# Patient Record
Sex: FEMALE | Race: WHITE | Employment: OTHER | URBAN - METROPOLITAN AREA
[De-identification: names, ages, dates, MRNs, and addresses within clinical notes are randomized per-mention and may not be internally consistent; named-entity substitution may affect disease eponyms.]

---

## 2020-07-28 ENCOUNTER — VIRTUAL VISIT (OUTPATIENT)
Dept: FAMILY MEDICINE CLINIC | Age: 55
End: 2020-07-28

## 2020-07-28 DIAGNOSIS — Z79.890 HORMONE REPLACEMENT THERAPY (HRT): ICD-10-CM

## 2020-07-28 DIAGNOSIS — J30.9 ALLERGIC RHINITIS, UNSPECIFIED SEASONALITY, UNSPECIFIED TRIGGER: ICD-10-CM

## 2020-07-28 DIAGNOSIS — R23.2 HOT FLASHES: ICD-10-CM

## 2020-07-28 DIAGNOSIS — I10 ESSENTIAL HYPERTENSION: Primary | ICD-10-CM

## 2020-07-28 RX ORDER — LOSARTAN POTASSIUM 50 MG/1
50 TABLET ORAL DAILY
Qty: 90 TAB | Refills: 0
Start: 2020-07-28 | End: 2020-07-28 | Stop reason: CLARIF

## 2020-07-28 RX ORDER — TESTOSTERONE CYPIONATE 200 MG/ML
0.28 INJECTION INTRAMUSCULAR
COMMUNITY
End: 2021-05-10

## 2020-07-28 RX ORDER — ESTRADIOL 0.05 MG/D
FILM, EXTENDED RELEASE TRANSDERMAL
COMMUNITY
End: 2021-05-10

## 2020-07-28 RX ORDER — PROGESTERONE 100 MG/1
CAPSULE ORAL
COMMUNITY
End: 2021-05-10

## 2020-07-28 NOTE — PROGRESS NOTES
Armando Sapp is a 63 y.o. female who was seen by synchronous (real-time) audio-video technology on 7/28/2020. Consent: Armando Sapp, who was seen by synchronous (real-time) audio-video technology, and/or her healthcare decision maker, is aware that this patient-initiated, Telehealth encounter on 7/28/2020 is a billable service, with coverage as determined by her insurance carrier. She is aware that she may receive a bill and has provided verbal consent to proceed: Yes. Subjective:   Armando Sapp is a 63 y.o. female who was seen for No chief complaint on file. Establish care, PCP moved    HTN- reports good range on losartan/hctz, unsure of dose, she will let us know. Says normotensive on this medication. Says patient first [facility] recently checked labs/refilled her medication. Will request records    Hot flashes-on HRT/ routine gyne care with Dr Gerald Crocker, per pt utd    Allergic rhinitis- following Dr. Mae Blackman. Says tested positive for cat/dog dander. She decided against allergy shots. Doing well on otc antihistamine. Per pt utd with [facility]- request records from prev PCP    Prior to Admission medications    Medication Sig Start Date End Date Taking? Authorizing Provider   estradioL (VIVELLE) 0.05 mg/24 hr estradiol 0.05 mg/24 hr semiweekly transdermal patch   APPLY 1 PATCH TO SKIN TWICE A WEEK AS DIRECTED    Provider, Historical   testosterone cypionate (DEPOTESTOTERONE CYPIONATE) 200 mg/mL injection 0.275 mL.     Provider, Historical   progesterone (PROMETRIUM) 100 mg capsule progesterone micronized 100 mg capsule    Provider, Historical     Not on File    Patient Active Problem List    Diagnosis Date Noted    Essential hypertension 07/28/2020    Allergic rhinitis 07/28/2020     Current Outpatient Medications   Medication Sig Dispense Refill    estradioL (VIVELLE) 0.05 mg/24 hr estradiol 0.05 mg/24 hr semiweekly transdermal patch   APPLY 1 PATCH TO SKIN TWICE A WEEK AS DIRECTED      testosterone cypionate (DEPOTESTOTERONE CYPIONATE) 200 mg/mL injection 0.275 mL.  progesterone (PROMETRIUM) 100 mg capsule progesterone micronized 100 mg capsule       Not on File  No past medical history on file. No past surgical history on file. No family history on file. Social History     Tobacco Use    Smoking status: Not on file   Substance Use Topics    Alcohol use: Not on file       ROS    Objective:   Vital Signs: (As obtained by patient/caregiver at home)  There were no vitals taken for this visit.      [INSTRUCTIONS:  \"[x]\" Indicates a positive item  \"[]\" Indicates a negative item  -- DELETE ALL ITEMS NOT EXAMINED]    Constitutional: [x] Appears well-developed and well-nourished [x] No apparent distress      [] Abnormal -     Mental status: [x] Alert and awake  [x] Oriented to person/place/time [x] Able to follow commands    [] Abnormal -     Eyes:   EOM    [x]  Normal    [] Abnormal -   Sclera  [x]  Normal    [] Abnormal -          Discharge [x]  None visible   [] Abnormal -     HENT: [x] Normocephalic, atraumatic  [] Abnormal -   [x] Mouth/Throat: Mucous membranes are moist    External Ears [x] Normal  [] Abnormal -    Neck: [x] No visualized mass [] Abnormal -     Pulmonary/Chest: [x] Respiratory effort normal   [x] No visualized signs of difficulty breathing or respiratory distress        [] Abnormal -      Musculoskeletal:   [x] Normal gait with no signs of ataxia         [x] Normal range of motion of neck        [] Abnormal -     Neurological:        [x] No Facial Asymmetry (Cranial nerve 7 motor function) (limited exam due to video visit)          [x] No gaze palsy        [] Abnormal -          Skin:        [x] No significant exanthematous lesions or discoloration noted on facial skin         [] Abnormal -            Psychiatric:       [x] Normal Affect [] Abnormal -        [x] No Hallucinations    Other pertinent observable physical exam findings:-        We discussed the expected course, resolution and complications of the diagnosis(es) in detail. Medication risks, benefits, costs, interactions, and alternatives were discussed as indicated. I advised her to contact the office if her condition worsens, changes or fails to improve as anticipated. She expressed understanding with the diagnosis(es) and plan. Vaughn Prince is a 63 y.o. female who was evaluated by a video visit encounter for concerns as above. Patient identification was verified prior to start of the visit. A caregiver was present when appropriate. Due to this being a TeleHealth encounter (During [facility] emergency), evaluation of the following organ systems was limited: Vitals/Constitutional/EENT/Resp/CV/GI//MS/Neuro/Skin/Heme-Lymph-Imm. Pursuant to the emergency declaration under the [address] [place] [address], [facility] waiver authority and the [employer] Act, this Virtual  Visit was conducted, with patient's (and/or legal guardian's) consent, to reduce the patient's risk of exposure to COVID-19 and provide necessary medical care. Services were provided through a video synchronous discussion virtually to substitute for in-person clinic visit. Patient and provider were located at their individual homes. Assessment & Plan:   Diagnoses and all orders for this visit:    1. Essential hypertension  Controlled  Cont losartan/hctz- to inform us of dose  Request records from prev PCP/pt first    2. Hot flashes  On HRT  Following gyne    3. Hormone replacement therapy (HRT)    4.  Allergic rhinitis, unspecified seasonality, unspecified trigger  Controlled    Ff-up in 3 months, plan for CPE  Mitzy Ramírez MD

## 2020-07-28 NOTE — PATIENT INSTRUCTIONS
DASH Diet: Care Instructions Your Care Instructions The DASH diet is an eating plan that can help lower your blood pressure. DASH stands for Dietary Approaches to Stop Hypertension. Hypertension is high blood pressure. The DASH diet focuses on eating foods that are high in calcium, potassium, and magnesium. These nutrients can lower blood pressure. The foods that are highest in these nutrients are fruits, vegetables, low-fat dairy products, nuts, seeds, and legumes. But taking calcium, potassium, and magnesium supplements instead of eating foods that are high in those nutrients does not have the same effect. The DASH diet also includes whole grains, fish, and poultry. The DASH diet is one of several lifestyle changes your doctor may recommend to lower your high blood pressure. Your doctor may also want you to decrease the amount of sodium in your diet. Lowering sodium while following the DASH diet can lower blood pressure even further than just the DASH diet alone. Follow-up care is a key part of your treatment and safety. Be sure to make and go to all appointments, and call your doctor if you are having problems. It's also a good idea to know your test results and keep a list of the medicines you take. How can you care for yourself at home? Following the DASH diet · Eat 4 to 5 servings of fruit each day. A serving is 1 medium-sized piece of fruit, ½ cup chopped or canned fruit, 1/4 cup dried fruit, or 4 ounces (½ cup) of fruit juice. Choose fruit more often than fruit juice. · Eat 4 to 5 servings of vegetables each day. A serving is 1 cup of lettuce or raw leafy vegetables, ½ cup of chopped or cooked vegetables, or 4 ounces (½ cup) of vegetable juice. Choose vegetables more often than vegetable juice. · Get 2 to 3 servings of low-fat and fat-free dairy each day. A serving is 8 ounces of milk, 1 cup of yogurt, or 1 ½ ounces of cheese. · Eat 6 to 8 servings of grains each day. A serving is 1 slice of bread, 1 ounce of dry cereal, or ½ cup of cooked rice, pasta, or cooked cereal. Try to choose whole-grain products as much as possible. · Limit lean meat, poultry, and fish to 2 servings each day. A serving is 3 ounces, about the size of a deck of cards. · Eat 4 to 5 servings of nuts, seeds, and legumes (cooked dried beans, lentils, and split peas) each week. A serving is 1/3 cup of nuts, 2 tablespoons of seeds, or ½ cup of cooked beans or peas. · Limit fats and oils to 2 to 3 servings each day. A serving is 1 teaspoon of vegetable oil or 2 tablespoons of salad dressing. · Limit sweets and added sugars to 5 servings or less a week. A serving is 1 tablespoon jelly or jam, ½ cup sorbet, or 1 cup of lemonade. · Eat less than 2,300 milligrams (mg) of sodium a day. If you limit your sodium to 1,500 mg a day, you can lower your blood pressure even more. Tips for success · Start small. Do not try to make dramatic changes to your diet all at once. You might feel that you are missing out on your favorite foods and then be more likely to not follow the plan. Make small changes, and stick with them. Once those changes become habit, add a few more changes. · Try some of the following: ? Make it a goal to eat a fruit or vegetable at every meal and at snacks. This will make it easy to get the recommended amount of fruits and vegetables each day. ? Try yogurt topped with fruit and nuts for a snack or healthy dessert. ? Add lettuce, tomato, cucumber, and onion to sandwiches. ? Combine a ready-made pizza crust with low-fat mozzarella cheese and lots of vegetable toppings. Try using tomatoes, squash, spinach, broccoli, carrots, cauliflower, and onions. ? Have a variety of cut-up vegetables with a low-fat dip as an appetizer instead of chips and dip. ? Sprinkle sunflower seeds or chopped almonds over salads.  Or try adding chopped walnuts or almonds to cooked vegetables. ? Try some vegetarian meals using beans and peas. Add garbanzo or kidney beans to salads. Make burritos and tacos with mashed guevara beans or black beans. Where can you learn more? Go to http://www.gray.com/ Enter A069 in the search box to learn more about \"DASH Diet: Care Instructions. \" Current as of: December 16, 2019               Content Version: 12.5 © 8214-6258 [employer], [employer]. Care instructions adapted under license by [employer] (which disclaims liability or warranty for this information). If you have questions about a medical condition or this instruction, always ask your healthcare professional. [address] any warranty or liability for your use of this information.

## 2020-10-29 ENCOUNTER — TELEPHONE (OUTPATIENT)
Dept: FAMILY MEDICINE CLINIC | Age: 55
End: 2020-10-29

## 2020-10-29 RX ORDER — LOSARTAN POTASSIUM AND HYDROCHLOROTHIAZIDE 12.5; 5 MG/1; MG/1
1 TABLET ORAL DAILY
Qty: 90 TAB | Refills: 0 | Status: SHIPPED | OUTPATIENT
Start: 2020-10-29 | End: 2021-01-22

## 2020-10-29 RX ORDER — LOSARTAN POTASSIUM AND HYDROCHLOROTHIAZIDE 12.5; 5 MG/1; MG/1
TABLET ORAL
COMMUNITY
Start: 2020-02-15 | End: 2020-10-29 | Stop reason: SDUPTHER

## 2020-10-29 NOTE — TELEPHONE ENCOUNTER
Pt called and stated is trying to request this mediction through [facility] and it isn't listed. lortan-HTCZ 50-12.5mg Please call pt at your earliest convenience.

## 2020-11-01 ENCOUNTER — E-VISIT (OUTPATIENT)
Dept: FAMILY MEDICINE CLINIC | Age: 55
End: 2020-11-01

## 2020-11-05 ENCOUNTER — TELEPHONE (OUTPATIENT)
Dept: FAMILY MEDICINE CLINIC | Age: 55
End: 2020-11-05

## 2020-11-06 ENCOUNTER — OFFICE VISIT (OUTPATIENT)
Dept: FAMILY MEDICINE CLINIC | Age: 55
End: 2020-11-06
Payer: COMMERCIAL

## 2020-11-06 VITALS
OXYGEN SATURATION: 97 % | WEIGHT: 152 LBS | DIASTOLIC BLOOD PRESSURE: 68 MMHG | SYSTOLIC BLOOD PRESSURE: 124 MMHG | HEIGHT: 64 IN | RESPIRATION RATE: 16 BRPM | HEART RATE: 70 BPM | TEMPERATURE: 98.6 F | BODY MASS INDEX: 25.95 KG/M2

## 2020-11-06 DIAGNOSIS — F43.23 ADJUSTMENT DISORDER WITH MIXED ANXIETY AND DEPRESSED MOOD: Primary | ICD-10-CM

## 2020-11-06 DIAGNOSIS — K21.9 GASTROESOPHAGEAL REFLUX DISEASE, UNSPECIFIED WHETHER ESOPHAGITIS PRESENT: ICD-10-CM

## 2020-11-06 DIAGNOSIS — I10 ESSENTIAL HYPERTENSION: ICD-10-CM

## 2020-11-06 DIAGNOSIS — Z11.59 ENCOUNTER FOR HEPATITIS C SCREENING TEST FOR LOW RISK PATIENT: ICD-10-CM

## 2020-11-06 DIAGNOSIS — Z13.1 SCREENING FOR DIABETES MELLITUS (DM): ICD-10-CM

## 2020-11-06 DIAGNOSIS — Z13.31 POSITIVE DEPRESSION SCREENING: ICD-10-CM

## 2020-11-06 DIAGNOSIS — L23.7 POISON IVY DERMATITIS: ICD-10-CM

## 2020-11-06 PROCEDURE — 99214 OFFICE O/P EST MOD 30 MIN: CPT | Performed by: FAMILY MEDICINE

## 2020-11-06 RX ORDER — CLOBETASOL PROPIONATE 0.5 MG/G
OINTMENT TOPICAL 2 TIMES DAILY
Qty: 15 G | Refills: 0 | Status: SHIPPED | OUTPATIENT
Start: 2020-11-06 | End: 2021-05-10

## 2020-11-06 RX ORDER — PANTOPRAZOLE SODIUM 40 MG/1
40 TABLET, DELAYED RELEASE ORAL DAILY
Qty: 90 TAB | Refills: 0 | Status: SHIPPED | OUTPATIENT
Start: 2020-11-06 | End: 2021-05-10 | Stop reason: SDUPTHER

## 2020-11-06 NOTE — PATIENT INSTRUCTIONS
Adjustment Disorder: Care Instructions  Your Care Instructions     Adjustment disorder means that you have emotional or behavioral problems because of stress. But your response to the stress is far more severe than a normal response. It is severe enough to affect your work or social life and may cause depression and physical pains and problems. Events that may cause this response can include a divorce, money problems, or starting school or a new job. It might be anything that causes some stress. This disorder is most often a short-term problem. It happens within 3 months of the stressful event or change. If the response lasts longer than 6 months after the event ends, you may have a more serious disorder. Follow-up care is a key part of your treatment and safety. Be sure to make and go to all appointments, and call your doctor if you are having problems. It's also a good idea to know your test results and keep a list of the medicines you take. How can you care for yourself at home? · Go to all counseling sessions. Do not skip any because you are feeling better. · If your doctor prescribed medicines, take them exactly as prescribed. Call your doctor if you think you are having a problem with your medicine. You will get more details on the specific medicines your doctor prescribes. · Discuss the causes of your stress with a good friend or family member. Or you can join a support group for people with similar problems. Talking to others sometimes relieves stress. · Get at least 30 minutes of exercise on most days of the week. Walking is a good choice. You also may want to do other activities, such as running, swimming, cycling, or playing tennis or team sports. Relaxation techniques  Do relaxation exercises 10 to 20 minutes a day. You can play soothing, relaxing music while you do them, if you wish. · Tell others in your house that you are going to do your relaxation exercises.  Ask them not to disturb you.  · Find a comfortable, quiet place. · Lie down on your back, or sit with your back straight. · Focus on your breathing. Make it slow and steady. · Breathe in through your nose. Breathe out through either your nose or mouth. · Breathe deeply, filling up the area between your navel and your rib cage. Breathe so that your belly goes up and down. · Do not hold your breath. · Breathe like this for 5 to 10 minutes. Notice the feeling of calmness throughout your whole body. As you continue to breathe slowly and deeply, relax by doing these next steps for another 5 to 10 minutes:  · Tighten and relax each muscle group in your body. Start at your toes, and work your way up to your head. · Imagine your muscle groups relaxing and getting heavy. · Empty your mind of all thoughts. · Let yourself relax more and more deeply. · Be aware of the state of calmness that surrounds you. · When your relaxation time is over, you can bring yourself back to alertness by moving your fingers and toes. Then move your hands and feet. And then move your entire body. Sometimes people fall asleep during relaxation. But they most often wake up soon. · Always give yourself time to return to full alertness before you drive a car. Wait to do anything that might cause an accident if you are not fully alert. Never play a relaxation tape while you drive a car. When should you call for help? Call 911 anytime you think you may need emergency care. For example, call if:    · You feel you cannot stop from hurting yourself or someone else. Keep the numbers for these national suicide hotlines: 2-699-566-TALK (7-639-055-605-550-3108) and 6-081-OCNHICK (3-835.762.7565). If you or someone you know talks about suicide or feeling hopeless, get help right away.    Watch closely for changes in your health, and be sure to contact your doctor if:    · You have new anxiety, or your anxiety gets worse.     · You have been feeling sad, depressed, or hopeless or have lost interest in things that you usually enjoy.     · You do not get better as expected. Where can you learn more? Go to http://www.gray.com/  Enter R087 in the search box to learn more about \"Adjustment Disorder: Care Instructions. \"  Current as of: December 16, 2019               Content Version: 12.6  © 6975-4414 [employer], [employer]. Care instructions adapted under license by [employer] (which disclaims liability or warranty for this information). If you have questions about a medical condition or this instruction, always ask your healthcare professional. [place] [address] any warranty or liability for your use of this information.

## 2020-11-06 NOTE — PROGRESS NOTES
Al Santos, 63 y.o.,  female    SUBJECTIVE  Anxiety past 2 months    Reports strained relationship with daughter, recent estrangement about 2 months ago when her symptoms started. She developed increasing heartburn, chest tightness, palpitations. She was evaluated in the ED 10/2, thought to be heartburn related. cbc/cmp/ekg/cxr done were normal. Says she has eliminated tomato based food, and some heartburn symptoms have improved. No dysphagia or melena. She says able to walk about 2 miles and work up moderate intensity without symptoms. Says she has definite pattern of chest tightness when she is under stress. She reports to have normal cardiac stress test a year ago. She mentions post partum depression, and bad thoughts on trial of paxil. She is not interested in any form of pharmacotherapy but open to talk therapy. C/o rash on L forearm when she was gardening, did notice poison ivy few weeks ago. She reports otc salves have helped and minimal scaling/rash at this time. ROS:  See HPI, all others negative        Patient Active Problem List   Diagnosis Code    Essential hypertension I10    Allergic rhinitis J30.9       Current Outpatient Medications   Medication Sig Dispense Refill    pantoprazole (PROTONIX) 40 mg tablet Take 1 Tab by mouth daily. 90 Tab 0    clobetasoL (TEMOVATE) 0.05 % ointment Apply  to affected area two (2) times a day. 15 g 0    losartan-hydroCHLOROthiazide (HYZAAR) 50-12.5 mg per tablet Take 1 Tab by mouth daily. 90 Tab 0    estradioL (VIVELLE) 0.05 mg/24 hr estradiol 0.05 mg/24 hr semiweekly transdermal patch   APPLY 1 PATCH TO SKIN TWICE A WEEK AS DIRECTED      testosterone cypionate (DEPOTESTOTERONE CYPIONATE) 200 mg/mL injection 0.275 mL.       progesterone (PROMETRIUM) 100 mg capsule progesterone micronized 100 mg capsule         No Known Allergies    Past Medical History:   Diagnosis Date    Hypertension        Social History     Socioeconomic History    Marital status:      Spouse name: Not on file    Number of children: Not on file    Years of education: Not on file    Highest education level: Not on file   Occupational History    Not on file   Social Needs    Financial resource strain: Not on file    Food insecurity     Worry: Not on file     Inability: Not on file    Transportation needs     Medical: Not on file     Non-medical: Not on file   Tobacco Use    Smoking status: Never Smoker    Smokeless tobacco: Never Used   Substance and Sexual Activity    Alcohol use: Yes     Alcohol/week: 5.0 standard drinks     Types: 5 Glasses of wine per week    Drug use: Not Currently    Sexual activity: Yes   Lifestyle    Physical activity     Days per week: Not on file     Minutes per session: Not on file    Stress: Not on file   Relationships    Social connections     Talks on phone: Not on file     Gets together: Not on file     Attends Congregational service: Not on file     Active member of club or organization: Not on file     Attends meetings of clubs or organizations: Not on file     Relationship status: Not on file    Intimate partner violence     Fear of current or ex partner: Not on file     Emotionally abused: Not on file     Physically abused: Not on file     Forced sexual activity: Not on file   Other Topics Concern    Not on file   Social History Narrative    Not on file       Family History   Problem Relation Age of Onset    Pulmonary Fibrosis Mother     Ovarian Cancer Mother     Heart Disease Father     Heart Disease Paternal Grandfather          OBJECTIVE    Physical Exam:     Visit Vitals  /68 (BP 1 Location: Left arm, BP Patient Position: Sitting)   Pulse 70   Temp 98.6 °F (37 °C) (Oral)   Resp 16   Ht 5' 4\" (1.626 m)   Wt 152 lb (68.9 kg)   SpO2 97%   BMI 26.09 kg/m²       General: alert, well-appearing,  in no apparent distress or pain  Head: atraumatic.  Non-tender maxillary and frontal sinuses  Eyes: Lids with no discharge, no matting, conjunctivae clear and non injected, full EOMs, PERLLA  Neck: supple, no adenopathy palpated  CVS: normal rate, regular rhythm, distinct S1 and S2  Lungs:clear to ausculation bilaterally, no crackles, wheezing or rhonchi noted  Abdomen: normoactive bowel sounds, soft, non-tender  Extremities: no edema, no cyanosis, MSK grossly normal  Skin: scaly maculopapular rash on L forearm  Psych:  mood and affect normal    Results for orders placed or performed during the hospital encounter of 07/09/19   POC HEMATOCRIT   Result Value Ref Range    HCT 46 (H) 38 - 45 %    HGB 15.6 12.4 - 17.2 gm/dl         ASSESSMENT/PLAN  Diagnoses and all orders for this visit:    1. Adjustment disorder with mixed anxiety and depressed mood  Needs better control  Pt hesitant to try medications, but open to talk therapy  Discussed regular exercise, gratitude journal  -     REFERRAL TO PSYCHOLOGY    2. Positive depression screening    3. Essential hypertension  Controlled  -     METABOLIC PANEL, COMPREHENSIVE; Future  -     LIPID PANEL; Future    4. Screening for diabetes mellitus (DM)  -     METABOLIC PANEL, COMPREHENSIVE; Future  -     HEMOGLOBIN A1C WITH EAG; Future    5. Poison ivy dermatitis  Improving  Start:  -     clobetasoL (TEMOVATE) 0.05 % ointment; Apply  to affected area two (2) times a day. 6. Encounter for hepatitis C screening test for low risk patient  -     HEPATITIS C AB; Future    7. Gastroesophageal reflux disease, unspecified whether esophagitis present  Improving with dietary measures  Start:  -     pantoprazole (PROTONIX) 40 mg tablet; Take 1 Tab by mouth daily. Follow-up and Dispositions    · Return in about 3 months (around 2/6/2021), or if symptoms worsen or fail to improve, for fasting labs a week prior to your next visit, routine chronic illness care. Patient understands plan of care. Patient has provided input and agrees with goals.

## 2021-01-17 LAB — SARS-COV-2, NAA: NOT DETECTED

## 2021-01-22 RX ORDER — LOSARTAN POTASSIUM AND HYDROCHLOROTHIAZIDE 12.5; 5 MG/1; MG/1
TABLET ORAL
Qty: 90 TAB | Refills: 0 | Status: SHIPPED | OUTPATIENT
Start: 2021-01-22 | End: 2021-05-10 | Stop reason: SDUPTHER

## 2021-02-06 ENCOUNTER — HOSPITAL ENCOUNTER (OUTPATIENT)
Dept: LAB | Age: 56
Discharge: HOME | End: 2021-02-06
Payer: COMMERCIAL

## 2021-02-06 DIAGNOSIS — Z13.1 SCREENING FOR DIABETES MELLITUS (DM): ICD-10-CM

## 2021-02-06 DIAGNOSIS — I10 ESSENTIAL HYPERTENSION: ICD-10-CM

## 2021-02-06 DIAGNOSIS — Z11.59 ENCOUNTER FOR HEPATITIS C SCREENING TEST FOR LOW RISK PATIENT: ICD-10-CM

## 2021-02-06 LAB
ALBUMIN SERPL-MCNC: 3.9 G/DL (ref 3.4–5)
ALBUMIN/GLOB SERPL: 1.3 {RATIO} (ref 0.8–1.7)
ALP SERPL-CCNC: 76 U/L (ref 45–117)
ALT SERPL-CCNC: 22 U/L (ref 13–56)
ANION GAP SERPL CALC-SCNC: 7 MMOL/L (ref 3–18)
AST SERPL-CCNC: 17 U/L (ref 10–38)
BILIRUB SERPL-MCNC: 0.6 MG/DL (ref 0.2–1)
BUN SERPL-MCNC: 16 MG/DL (ref 7–18)
BUN/CREAT SERPL: 23 (ref 12–20)
CALCIUM SERPL-MCNC: 8.9 MG/DL (ref 8.5–10.1)
CHLORIDE SERPL-SCNC: 108 MMOL/L (ref 100–111)
CHOLEST SERPL-MCNC: 202 MG/DL
CO2 SERPL-SCNC: 30 MMOL/L (ref 21–32)
CREAT SERPL-MCNC: 0.69 MG/DL (ref 0.6–1.3)
EST. AVERAGE GLUCOSE BLD GHB EST-MCNC: 105 MG/DL
GLOBULIN SER CALC-MCNC: 3.1 G/DL (ref 2–4)
GLUCOSE SERPL-MCNC: 98 MG/DL (ref 74–99)
HBA1C MFR BLD: 5.3 % (ref 4.2–5.6)
HDLC SERPL-MCNC: 84 MG/DL (ref 40–60)
HDLC SERPL: 2.4 {RATIO} (ref 0–5)
LDLC SERPL CALC-MCNC: 103.6 MG/DL (ref 0–100)
LIPID PROFILE,FLP: ABNORMAL
POTASSIUM SERPL-SCNC: 4.5 MMOL/L (ref 3.5–5.5)
PROT SERPL-MCNC: 7 G/DL (ref 6.4–8.2)
SODIUM SERPL-SCNC: 145 MMOL/L (ref 136–145)
TRIGL SERPL-MCNC: 72 MG/DL (ref ?–150)
VLDLC SERPL CALC-MCNC: 14.4 MG/DL

## 2021-02-06 PROCEDURE — 36415 COLL VENOUS BLD VENIPUNCTURE: CPT

## 2021-02-06 PROCEDURE — 83036 HEMOGLOBIN GLYCOSYLATED A1C: CPT

## 2021-02-06 PROCEDURE — 80053 COMPREHEN METABOLIC PANEL: CPT

## 2021-02-06 PROCEDURE — 80061 LIPID PANEL: CPT

## 2021-02-06 PROCEDURE — 86803 HEPATITIS C AB TEST: CPT

## 2021-02-08 ENCOUNTER — OFFICE VISIT (OUTPATIENT)
Dept: FAMILY MEDICINE CLINIC | Age: 56
End: 2021-02-08
Payer: COMMERCIAL

## 2021-02-08 VITALS
BODY MASS INDEX: 24.59 KG/M2 | TEMPERATURE: 98.5 F | SYSTOLIC BLOOD PRESSURE: 120 MMHG | RESPIRATION RATE: 16 BRPM | HEIGHT: 64 IN | OXYGEN SATURATION: 98 % | DIASTOLIC BLOOD PRESSURE: 74 MMHG | HEART RATE: 78 BPM | WEIGHT: 144 LBS

## 2021-02-08 DIAGNOSIS — Z01.419 ROUTINE GYNECOLOGICAL EXAMINATION: ICD-10-CM

## 2021-02-08 DIAGNOSIS — R10.11 RUQ ABDOMINAL PAIN: Primary | ICD-10-CM

## 2021-02-08 DIAGNOSIS — F43.22 ADJUSTMENT DISORDER WITH ANXIOUS MOOD: ICD-10-CM

## 2021-02-08 DIAGNOSIS — R10.11 RUQ ABDOMINAL PAIN: ICD-10-CM

## 2021-02-08 LAB
HCV AB SER IA-ACNC: 0.04 INDEX
HCV AB SERPL QL IA: NEGATIVE
HCV COMMENT,HCGAC: NORMAL

## 2021-02-08 PROCEDURE — 99214 OFFICE O/P EST MOD 30 MIN: CPT | Performed by: FAMILY MEDICINE

## 2021-02-08 NOTE — PROGRESS NOTES
Ebony Carrillo, 63 y.o.,  female    SUBJECTIVE  Ff-up and concern    Past month, has had intermittent RUQ after fatty meal. Says she is on ketogenic diet and IF with successful wait loss result. Recalls one night after porkchop meal with severe RUQ which spontaneously resolved. She denies any symptoms currently , no urinary symptoms, no fever, no jaundice, changes to BM. Says mom had GB stones    Anxiety- says this has somewhat improved, says exercising and cleaning up her art area helps. She did not hear back for talk therapist, but still open to going. She declines medication trial at this time. ROS:  See HPI, all others negative        Patient Active Problem List   Diagnosis Code    Essential hypertension I10    Allergic rhinitis J30.9       Current Outpatient Medications   Medication Sig Dispense Refill    losartan-hydroCHLOROthiazide (HYZAAR) 50-12.5 mg per tablet TAKE 1 TABLET BY MOUTH EVERY DAY 90 Tab 0    pantoprazole (PROTONIX) 40 mg tablet Take 1 Tab by mouth daily. 90 Tab 0    clobetasoL (TEMOVATE) 0.05 % ointment Apply  to affected area two (2) times a day. 15 g 0    estradioL (VIVELLE) 0.05 mg/24 hr estradiol 0.05 mg/24 hr semiweekly transdermal patch   APPLY 1 PATCH TO SKIN TWICE A WEEK AS DIRECTED      testosterone cypionate (DEPOTESTOTERONE CYPIONATE) 200 mg/mL injection 0.275 mL.       progesterone (PROMETRIUM) 100 mg capsule progesterone micronized 100 mg capsule         No Known Allergies    Past Medical History:   Diagnosis Date    Hypertension        Social History     Socioeconomic History    Marital status:      Spouse name: Not on file    Number of children: Not on file    Years of education: Not on file    Highest education level: Not on file   Occupational History    Not on file   Social Needs    Financial resource strain: Not on file    Food insecurity     Worry: Not on file     Inability: Not on file    Transportation needs     Medical: Not on file Non-medical: Not on file   Tobacco Use    Smoking status: Never Smoker    Smokeless tobacco: Never Used   Substance and Sexual Activity    Alcohol use:  Yes     Alcohol/week: 5.0 standard drinks     Types: 5 Glasses of wine per week    Drug use: Not Currently    Sexual activity: Yes   Lifestyle    Physical activity     Days per week: Not on file     Minutes per session: Not on file    Stress: Not on file   Relationships    Social connections     Talks on phone: Not on file     Gets together: Not on file     Attends Rastafari service: Not on file     Active member of club or organization: Not on file     Attends meetings of clubs or organizations: Not on file     Relationship status: Not on file    Intimate partner violence     Fear of current or ex partner: Not on file     Emotionally abused: Not on file     Physically abused: Not on file     Forced sexual activity: Not on file   Other Topics Concern    Not on file   Social History Narrative    Not on file       Family History   Problem Relation Age of Onset    Pulmonary Fibrosis Mother     Ovarian Cancer Mother     Heart Disease Father     Heart Disease Paternal Grandfather          OBJECTIVE    Physical Exam:     Visit Vitals  /74 (BP 1 Location: Left upper arm, BP Patient Position: Sitting, BP Cuff Size: Adult)   Pulse 78   Temp 98.5 °F (36.9 °C) (Oral)   Resp 16   Ht 5' 4\" (1.626 m)   Wt 144 lb (65.3 kg)   SpO2 98%   BMI 24.72 kg/m²       General: alert, well-appearing,in no apparent distress or pain  Neck: supple, no adenopathy palpated  CVS: normal rate, regular rhythm, distinct S1 and S2  Lungs:clear to ausculation bilaterally, no crackles, wheezing or rhonchi noted  Abdomen: normoactive bowel sounds, soft, non-tender, neg murphys  Extremities: no edema, no cyanosis, MSK grossly normal  Skin: warm, no lesions, rashes noted  Psych:  mood and affect normal        ASSESSMENT/PLAN  Diagnoses and all orders for this visit:    1. RUQ abdominal pain  Suspect biliary colic, if neg US, will plan on check for dyskinesia and surgery consult   For now, low fat diet, may cont IF but not ketogenic  -     US ABD LTD; Future  -     CBC WITH AUTOMATED DIFF; Future  -     LIPASE; Future    2. Routine gynecological examination  Due, requesting to see another group  -     REFERRAL TO GYNECOLOGY    3. Adjustment disorder with anxious mood  Fair control  Cont art/exercise  Pursue talk therapy, provided #      Follow-up and Dispositions    · Return in about 3 months (around 5/8/2021), or if symptoms worsen or fail to improve, for labs soon, routine chronic illness care. Patient understands plan of care. Patient has provided input and agrees with goals.

## 2021-02-08 NOTE — PATIENT INSTRUCTIONS
A Healthy Heart: Care Instructions  Your Care Instructions     Coronary artery disease, also called heart disease, occurs when a substance called plaque builds up in the vessels that supply oxygen-rich blood to your heart muscle. This can narrow the blood vessels and reduce blood flow. A heart attack happens when blood flow is completely blocked. A high-fat diet, smoking, and other factors increase the risk of heart disease. Your doctor has found that you have a chance of having heart disease. You can do lots of things to keep your heart healthy. It may not be easy, but you can change your diet, exercise more, and quit smoking. These steps really work to lower your chance of heart disease. Follow-up care is a key part of your treatment and safety. Be sure to make and go to all appointments, and call your doctor if you are having problems. It's also a good idea to know your test results and keep a list of the medicines you take. How can you care for yourself at home? Diet    · Use less salt when you cook and eat. This helps lower your blood pressure. Taste food before salting. Add only a little salt when you think you need it. With time, your taste buds will adjust to less salt.     · Eat fewer snack items, fast foods, canned soups, and other high-salt, high-fat, processed foods.     · Read food labels and try to avoid saturated and trans fats. They increase your risk of heart disease by raising cholesterol levels.     · Limit the amount of solid fat-butter, margarine, and shortening-you eat. Use olive, peanut, or canola oil when you cook. Bake, broil, and steam foods instead of frying them.     · Eat a variety of fruit and vegetables every day. Dark green, deep orange, red, or yellow fruits and vegetables are especially good for you. Examples include spinach, carrots, peaches, and berries.     · Foods high in fiber can reduce your cholesterol and provide important vitamins and minerals.  High-fiber foods include whole-grain cereals and breads, oatmeal, beans, brown rice, citrus fruits, and apples.     · Eat lean proteins. Heart-healthy proteins include seafood, lean meats and poultry, eggs, beans, peas, nuts, seeds, and soy products.     · Limit drinks and foods with added sugar. These include candy, desserts, and soda pop. Lifestyle changes    · If your doctor recommends it, get more exercise. Walking is a good choice. Bit by bit, increase the amount you walk every day. Try for at least 30 minutes on most days of the week. You also may want to swim, bike, or do other activities.     · Do not smoke. If you need help quitting, talk to your doctor about stop-smoking programs and medicines. These can increase your chances of quitting for good. Quitting smoking may be the most important step you can take to protect your heart. It is never too late to quit.     · Limit alcohol to 2 drinks a day for men and 1 drink a day for women. Too much alcohol can cause health problems.     · Manage other health problems such as diabetes, high blood pressure, and high cholesterol. If you think you may have a problem with alcohol or drug use, talk to your doctor. Medicines    · Take your medicines exactly as prescribed. Call your doctor if you think you are having a problem with your medicine.     · If your doctor recommends aspirin, take the amount directed each day. Make sure you take aspirin and not another kind of pain reliever, such as acetaminophen (Tylenol). When should you call for help? Call 911 if you have symptoms of a heart attack. These may include:    · Chest pain or pressure, or a strange feeling in the chest.     · Sweating.     · Shortness of breath.     · Pain, pressure, or a strange feeling in the back, neck, jaw, or upper belly or in one or both shoulders or arms.     · Lightheadedness or sudden weakness.     · A fast or irregular heartbeat.    After you call 911, the  may tell you to chew 1 adult-strength or 2 to 4 low-dose aspirin. Wait for an ambulance. Do not try to drive yourself. Watch closely for changes in your health, and be sure to contact your doctor if you have any problems. Where can you learn more? Go to http://www.escalona.com/  Enter F075 in the search box to learn more about \"A Healthy Heart: Care Instructions. \"  Current as of: December 16, 2019               Content Version: 12.6  © 4921-8719 [employer]. Care instructions adapted under license by [employer] (which disclaims liability or warranty for this information). If you have questions about a medical condition or this instruction, always ask your healthcare professional. [address] any warranty or liability for your use of this information.

## 2021-02-09 ENCOUNTER — HOSPITAL ENCOUNTER (OUTPATIENT)
Dept: LAB | Age: 56
Discharge: HOME | End: 2021-02-09
Payer: COMMERCIAL

## 2021-02-09 DIAGNOSIS — R10.11 RUQ ABDOMINAL PAIN: ICD-10-CM

## 2021-02-09 LAB
BASOPHILS # BLD: 0 K/UL (ref 0–0.1)
BASOPHILS NFR BLD: 0 % (ref 0–2)
DIFFERENTIAL METHOD BLD: NORMAL
EOSINOPHIL # BLD: 0.1 K/UL (ref 0–0.4)
EOSINOPHIL NFR BLD: 1 % (ref 0–5)
ERYTHROCYTE [DISTWIDTH] IN BLOOD BY AUTOMATED COUNT: 12.5 % (ref 11.6–14.5)
HCT VFR BLD AUTO: 43.1 % (ref 35–45)
HGB BLD-MCNC: 14.4 G/DL (ref 12–16)
LIPASE SERPL-CCNC: 212 U/L (ref 73–393)
LYMPHOCYTES # BLD: 3.1 K/UL (ref 0.9–3.6)
LYMPHOCYTES NFR BLD: 40 % (ref 21–52)
MCH RBC QN AUTO: 30.8 PG (ref 24–34)
MCHC RBC AUTO-ENTMCNC: 33.4 G/DL (ref 31–37)
MCV RBC AUTO: 92.1 FL (ref 74–97)
MONOCYTES # BLD: 0.6 K/UL (ref 0.05–1.2)
MONOCYTES NFR BLD: 7 % (ref 3–10)
NEUTS SEG # BLD: 4.1 K/UL (ref 1.8–8)
NEUTS SEG NFR BLD: 52 % (ref 40–73)
PLATELET # BLD AUTO: 273 K/UL (ref 135–420)
PMV BLD AUTO: 11.1 FL (ref 9.2–11.8)
RBC # BLD AUTO: 4.68 M/UL (ref 4.2–5.3)
WBC # BLD AUTO: 7.9 K/UL (ref 4.6–13.2)

## 2021-02-09 PROCEDURE — 36415 COLL VENOUS BLD VENIPUNCTURE: CPT

## 2021-02-09 PROCEDURE — 85025 COMPLETE CBC W/AUTO DIFF WBC: CPT

## 2021-02-09 PROCEDURE — 83690 ASSAY OF LIPASE: CPT

## 2021-02-11 ENCOUNTER — HOSPITAL ENCOUNTER (OUTPATIENT)
Dept: ULTRASOUND IMAGING | Age: 56
Discharge: HOME | End: 2021-02-11
Attending: FAMILY MEDICINE
Payer: COMMERCIAL

## 2021-02-11 PROCEDURE — 76705 ECHO EXAM OF ABDOMEN: CPT

## 2021-02-15 DIAGNOSIS — R10.11 RUQ PAIN: ICD-10-CM

## 2021-02-15 DIAGNOSIS — R10.11 RUQ PAIN: Primary | ICD-10-CM

## 2021-02-15 NOTE — PROGRESS NOTES
US of the abdomen did not show any gallbladder stone, as discussed, we will investigate further to check for biliary function with hepatobiliary scan  Incidental finding of possible R kidney stone, I suggest we refer her to urologist to further evaluate this, pls notify pt, will send out referral after she is notified.

## 2021-02-22 ENCOUNTER — HOSPITAL ENCOUNTER (OUTPATIENT)
Dept: NUCLEAR MEDICINE | Age: 56
Discharge: HOME | End: 2021-02-22
Attending: FAMILY MEDICINE
Payer: COMMERCIAL

## 2021-02-22 VITALS — WEIGHT: 140 LBS | BODY MASS INDEX: 24.03 KG/M2

## 2021-02-22 DIAGNOSIS — R10.11 RUQ PAIN: ICD-10-CM

## 2021-02-22 PROCEDURE — 74011250636 HC RX REV CODE- 250/636: Performed by: FAMILY MEDICINE

## 2021-02-22 PROCEDURE — A9537 TC99M MEBROFENIN: HCPCS

## 2021-02-22 PROCEDURE — 74011000258 HC RX REV CODE- 258: Performed by: FAMILY MEDICINE

## 2021-02-22 RX ORDER — SODIUM CHLORIDE 9 MG/ML
50 INJECTION, SOLUTION INTRAVENOUS
Status: COMPLETED | OUTPATIENT
Start: 2021-02-22 | End: 2021-02-22

## 2021-02-22 RX ADMIN — SODIUM CHLORIDE 50 ML/HR: 900 INJECTION, SOLUTION INTRAVENOUS at 10:25

## 2021-02-22 RX ADMIN — SINCALIDE 1.3 MCG: 5 INJECTION, POWDER, LYOPHILIZED, FOR SOLUTION INTRAVENOUS at 10:25

## 2021-02-22 NOTE — PROGRESS NOTES
Imaging studies, including anatomy and function of gall bladder is normal  If persistent RUQ pain, will refer to GI for further evaluation. Also offer PPI (trial prilosec) to see if this helps.  pls notify pt

## 2021-02-23 DIAGNOSIS — N20.0 RIGHT RENAL STONE: Primary | ICD-10-CM

## 2021-02-23 NOTE — PROGRESS NOTES
Patient identified with 2 identifiers (name and ). Patient aware of imaging studies including anatomy and function of gall bladder is normal.   Patient advised if persist will refer to GI. Patient will hold off on GI referral until after she see urology.   Patient states she is taking protonix

## 2021-02-23 NOTE — PROGRESS NOTES
Patient identified with 2 identifiers (name and ). Patient aware of US of the abdomen did not show any gallbladder stone, as discussed, we will investigate further to check for biliary function with hepatobiliary scan   Incidental finding of possible R kidney stone.  Patient is agreeable to urology referral

## 2021-05-10 ENCOUNTER — OFFICE VISIT (OUTPATIENT)
Dept: FAMILY MEDICINE CLINIC | Age: 56
End: 2021-05-10
Payer: COMMERCIAL

## 2021-05-10 VITALS
HEART RATE: 61 BPM | RESPIRATION RATE: 16 BRPM | DIASTOLIC BLOOD PRESSURE: 70 MMHG | OXYGEN SATURATION: 98 % | BODY MASS INDEX: 24.55 KG/M2 | WEIGHT: 143.8 LBS | HEIGHT: 64 IN | SYSTOLIC BLOOD PRESSURE: 100 MMHG | TEMPERATURE: 97.1 F

## 2021-05-10 DIAGNOSIS — F41.9 ANXIETY: ICD-10-CM

## 2021-05-10 DIAGNOSIS — I10 ESSENTIAL HYPERTENSION: Primary | ICD-10-CM

## 2021-05-10 DIAGNOSIS — Z13.1 SCREENING FOR DIABETES MELLITUS (DM): ICD-10-CM

## 2021-05-10 DIAGNOSIS — R10.11 RUQ PAIN: ICD-10-CM

## 2021-05-10 DIAGNOSIS — N20.0 RENAL STONE: ICD-10-CM

## 2021-05-10 PROCEDURE — 99214 OFFICE O/P EST MOD 30 MIN: CPT | Performed by: FAMILY MEDICINE

## 2021-05-10 RX ORDER — PANTOPRAZOLE SODIUM 40 MG/1
40 TABLET, DELAYED RELEASE ORAL DAILY
Qty: 90 TAB | Refills: 0 | Status: SHIPPED | OUTPATIENT
Start: 2021-05-10 | End: 2021-08-04

## 2021-05-10 RX ORDER — LOSARTAN POTASSIUM AND HYDROCHLOROTHIAZIDE 12.5; 5 MG/1; MG/1
TABLET ORAL
Qty: 90 TAB | Refills: 0 | Status: SHIPPED | OUTPATIENT
Start: 2021-05-10 | End: 2021-05-27 | Stop reason: SDUPTHER

## 2021-05-10 NOTE — PROGRESS NOTES
Nena Novoa, 63 y.o.,  female    SUBJECTIVE  Ff-up    RUQ pain- US/Hida scan negative for GB pathology. 4mm R renal calculi seen. Denies urinary symptoms or colic. She is on protonix and reports resolution of symptoms. Anxiety- talk therapy has been established and plans to continue, this has improved as well    HTN- taking medication without problems    ROS:  See HPI, all others negative        Patient Active Problem List   Diagnosis Code    Essential hypertension I10    Allergic rhinitis J30.9       Current Outpatient Medications   Medication Sig Dispense Refill    losartan-hydroCHLOROthiazide (HYZAAR) 50-12.5 mg per tablet TAKE 1 TABLET BY MOUTH EVERY DAY 90 Tab 0    pantoprazole (PROTONIX) 40 mg tablet Take 1 Tab by mouth daily. 90 Tab 0       Allergies   Allergen Reactions    Onion Other (comments)       Past Medical History:   Diagnosis Date    Hypertension        Social History     Socioeconomic History    Marital status:      Spouse name: Not on file    Number of children: Not on file    Years of education: Not on file    Highest education level: Not on file   Occupational History    Not on file   Social Needs    Financial resource strain: Not on file    Food insecurity     Worry: Not on file     Inability: Not on file    Transportation needs     Medical: Not on file     Non-medical: Not on file   Tobacco Use    Smoking status: Never Smoker    Smokeless tobacco: Never Used   Substance and Sexual Activity    Alcohol use:  Yes     Alcohol/week: 5.0 standard drinks     Types: 5 Glasses of wine per week    Drug use: Not Currently    Sexual activity: Yes   Lifestyle    Physical activity     Days per week: Not on file     Minutes per session: Not on file    Stress: Not on file   Relationships    Social connections     Talks on phone: Not on file     Gets together: Not on file     Attends Mandaen service: Not on file     Active member of club or organization: Not on file Attends meetings of clubs or organizations: Not on file     Relationship status: Not on file    Intimate partner violence     Fear of current or ex partner: Not on file     Emotionally abused: Not on file     Physically abused: Not on file     Forced sexual activity: Not on file   Other Topics Concern    Not on file   Social History Narrative    Not on file       Family History   Problem Relation Age of Onset    Pulmonary Fibrosis Mother     Ovarian Cancer Mother     Heart Disease Father     Heart Disease Paternal Grandfather          OBJECTIVE    Physical Exam:     Visit Vitals  /70 (BP 1 Location: Left upper arm, BP Patient Position: Sitting, BP Cuff Size: Adult)   Pulse 61   Temp 97.1 °F (36.2 °C) (Temporal)   Resp 16   Ht 5' 4\" (1.626 m)   Wt 143 lb 12.8 oz (65.2 kg)   SpO2 98%   BMI 24.68 kg/m²       General: alert, well-appearing,  in no apparent distress or pain  Neck: supple, no adenopathy palpated  CVS: normal rate, regular rhythm, distinct S1 and S2  Lungs:clear to ausculation bilaterally, no crackles, wheezing or rhonchi noted  Abdomen: normoactive bowel sounds, soft, non-tender  Extremities: no edema, no cyanosis, MSK grossly normal  Skin: warm, no lesions, rashes noted  Psych:  mood and affect normal        ASSESSMENT/PLAN  Diagnoses and all orders for this visit:    1. Essential hypertension  Controlled  Cont hyzaar  -     METABOLIC PANEL, COMPREHENSIVE; Future  -     LIPID PANEL; Future    2. Screening for diabetes mellitus (DM)  -     METABOLIC PANEL, COMPREHENSIVE; Future  -     HEMOGLOBIN A1C WITH EAG; Future    3. Renal stone  R, asymptomatic  Monitoring    4. RUQ pain  Resolved  Cont protonix for 3 more months then try to wean off, discussed dietary changes    5. Anxiety  Improving  Cont talk therapy      Other orders  -     losartan-hydroCHLOROthiazide (HYZAAR) 50-12.5 mg per tablet; TAKE 1 TABLET BY MOUTH EVERY DAY  -     pantoprazole (PROTONIX) 40 mg tablet;  Take 1 Tab by mouth daily.    Provided gyne resources    Follow-up and Dispositions    · Return in about 6 months (around 11/10/2021). fasting labs prior        Patient understands plan of care. Patient has provided input and agrees with goals.

## 2021-05-10 NOTE — PROGRESS NOTES
1. Have you been to the ER, urgent care clinic since your last visit? Hospitalized since your last visit? No    2. Have you seen or consulted any other health care providers outside of the [address] [place] [address] since your last visit? Include any pap smears or colon screening.  No    Chief Complaint   Patient presents with    Anxiety    Abdominal Pain     RUQ

## 2021-05-10 NOTE — PATIENT INSTRUCTIONS
Gastroesophageal Reflux Disease (GERD): Care Instructions  Overview     Gastroesophageal reflux disease (GERD) is the backward flow of stomach acid into the esophagus. The esophagus is the tube that leads from your throat to your stomach. A one-way valve prevents the stomach acid from backing up into this tube. But when you have GERD, this valve does not close tightly enough. This can also cause pain and swelling in your esophagus. (This is called esophagitis.)  If you have mild GERD symptoms including heartburn, you may be able to control the problem with antacids or over-the-counter medicine. You can also make lifestyle changes to help reduce your symptoms. These include changing your diet and eating habits, such as not eating late at night and losing weight. Follow-up care is a key part of your treatment and safety. Be sure to make and go to all appointments, and call your doctor if you are having problems. It's also a good idea to know your test results and keep a list of the medicines you take. How can you care for yourself at home? · Take your medicines exactly as prescribed. Call your doctor if you think you are having a problem with your medicine. · Your doctor may recommend over-the-counter medicine. For mild or occasional indigestion, antacids, such as Tums, Gaviscon, Mylanta, or Maalox, may help. Your doctor also may recommend over-the-counter acid reducers, such as famotidine (Pepcid AC), cimetidine (Tagamet HB), or omeprazole (Prilosec). Read and follow all instructions on the label. If you use these medicines often, talk with your doctor. · Change your eating habits. ? It's best to eat several small meals instead of two or three large meals. ? After you eat, wait 2 to 3 hours before you lie down. ? Chocolate, mint, and alcohol can make GERD worse. ? Spicy foods, foods that have a lot of acid (like tomatoes and oranges), and coffee can make GERD symptoms worse in some people.  If your symptoms are worse after you eat a certain food, you may want to stop eating that food to see if your symptoms get better. · Do not smoke or chew tobacco. Smoking can make GERD worse. If you need help quitting, talk to your doctor about stop-smoking programs and medicines. These can increase your chances of quitting for good. · If you have GERD symptoms at night, raise the head of your bed 6 to 8 inches by putting the frame on blocks or placing a foam wedge under the head of your mattress. (Adding extra pillows does not work.)  · Do not wear tight clothing around your middle. · Lose weight if you need to. Losing just 5 to 10 pounds can help. When should you call for help? Call your doctor now or seek immediate medical care if:    · You have new or different belly pain.     · Your stools are black and tarlike or have streaks of blood. Watch closely for changes in your health, and be sure to contact your doctor if:    · Your symptoms have not improved after 2 days.     · Food seems to catch in your throat or chest.   Where can you learn more? Go to http://www.gray.com/  Enter H627 in the search box to learn more about \"Gastroesophageal Reflux Disease (GERD): Care Instructions. \"  Current as of: April 15, 2020               Content Version: 12.8  © 2006-2021 [employer]. Care instructions adapted under license by [employer] (which disclaims liability or warranty for this information). If you have questions about a medical condition or this instruction, always ask your healthcare professional. [place] [address] any warranty or liability for your use of this information.

## 2021-08-04 RX ORDER — PANTOPRAZOLE SODIUM 40 MG/1
TABLET, DELAYED RELEASE ORAL
Qty: 90 TABLET | Refills: 0 | Status: SHIPPED | OUTPATIENT
Start: 2021-08-04 | End: 2021-11-01

## 2021-11-01 RX ORDER — PANTOPRAZOLE SODIUM 40 MG/1
TABLET, DELAYED RELEASE ORAL
Qty: 90 TABLET | Refills: 0 | Status: SHIPPED | OUTPATIENT
Start: 2021-11-01

## 2021-11-15 RX ORDER — LOSARTAN POTASSIUM AND HYDROCHLOROTHIAZIDE 12.5; 5 MG/1; MG/1
TABLET ORAL
Qty: 90 TABLET | Refills: 0 | Status: SHIPPED | OUTPATIENT
Start: 2021-11-15 | End: 2022-02-22

## 2022-02-22 RX ORDER — LOSARTAN POTASSIUM AND HYDROCHLOROTHIAZIDE 12.5; 5 MG/1; MG/1
TABLET ORAL
Qty: 90 TABLET | Refills: 0 | Status: SHIPPED | OUTPATIENT
Start: 2022-02-22 | End: 2022-05-23

## 2022-03-19 PROBLEM — I10 ESSENTIAL HYPERTENSION: Status: ACTIVE | Noted: 2020-07-28

## 2022-03-19 PROBLEM — J30.9 ALLERGIC RHINITIS: Status: ACTIVE | Noted: 2020-07-28

## 2022-07-13 RX ORDER — LOSARTAN POTASSIUM AND HYDROCHLOROTHIAZIDE 12.5; 5 MG/1; MG/1
TABLET ORAL
Qty: 30 TABLET | Refills: 0 | Status: SHIPPED | OUTPATIENT
Start: 2022-07-13 | End: 2022-08-16

## 2022-08-16 RX ORDER — LOSARTAN POTASSIUM AND HYDROCHLOROTHIAZIDE 12.5; 5 MG/1; MG/1
TABLET ORAL
Qty: 30 TABLET | Refills: 0 | Status: SHIPPED | OUTPATIENT
Start: 2022-08-16 | End: 2022-09-12 | Stop reason: SDUPTHER

## 2022-08-26 ENCOUNTER — TELEPHONE (OUTPATIENT)
Dept: FAMILY MEDICINE CLINIC | Age: 57
End: 2022-08-26

## 2022-08-26 NOTE — TELEPHONE ENCOUNTER
Appointment Request From: Yun Oliva     With Provider: MD Chyna Maya [facility]     Preferred Date Range: 8/25/2022 - 9/9/2022     Preferred Times: Any Time     Reason for visit: Routine/Problem Visit     Comments:  Upper abd pain. Ongoing for some time. Now includes nausea.

## 2022-08-30 NOTE — TELEPHONE ENCOUNTER
Called patient. No answer. Left message that I have schedule her for 09/07/2022 at 0945 am. Also sent patient a My Chart message.

## 2022-09-06 NOTE — PROGRESS NOTES
Chief Complaint   Patient presents with    Abdominal Pain     C/o abdominal pain     Nausea    Nasal Congestion    Back Pain     C/o back pain         1. \"Have you been to the ER, urgent care clinic since your last visit? Hospitalized since your last visit? \" No    2. \"Have you seen or consulted any other health care providers outside of the [address] [place] [address] since your last visit? \" No     3. For patients aged 39-70: Has the patient had a colonoscopy / FIT/ Cologuard? No      If the patient is female:    4. For patients aged 41-77: Has the patient had a mammogram within the past 2 years? Yes - no Care Gap present      5. For patients aged 21-65: Has the patient had a pap smear?  No

## 2022-09-07 ENCOUNTER — TELEPHONE (OUTPATIENT)
Dept: PHYSICAL THERAPY | Age: 57
End: 2022-09-07

## 2022-09-07 ENCOUNTER — OFFICE VISIT (OUTPATIENT)
Dept: FAMILY MEDICINE CLINIC | Age: 57
End: 2022-09-07
Payer: COMMERCIAL

## 2022-09-07 ENCOUNTER — HOSPITAL ENCOUNTER (OUTPATIENT)
Dept: GENERAL RADIOLOGY | Age: 57
Discharge: HOME | End: 2022-09-07
Payer: COMMERCIAL

## 2022-09-07 ENCOUNTER — HOSPITAL ENCOUNTER (OUTPATIENT)
Dept: LAB | Age: 57
Discharge: HOME | End: 2022-09-07
Payer: COMMERCIAL

## 2022-09-07 VITALS
TEMPERATURE: 98 F | HEIGHT: 64 IN | DIASTOLIC BLOOD PRESSURE: 84 MMHG | RESPIRATION RATE: 18 BRPM | WEIGHT: 151.25 LBS | OXYGEN SATURATION: 98 % | HEART RATE: 73 BPM | BODY MASS INDEX: 25.82 KG/M2 | SYSTOLIC BLOOD PRESSURE: 128 MMHG

## 2022-09-07 DIAGNOSIS — Z12.11 COLON CANCER SCREENING: ICD-10-CM

## 2022-09-07 DIAGNOSIS — M54.9 MID BACK PAIN: ICD-10-CM

## 2022-09-07 DIAGNOSIS — R14.0 BLOATING: ICD-10-CM

## 2022-09-07 DIAGNOSIS — R10.10 PAIN OF UPPER ABDOMEN: ICD-10-CM

## 2022-09-07 DIAGNOSIS — R10.10 PAIN OF UPPER ABDOMEN: Primary | ICD-10-CM

## 2022-09-07 LAB
ALBUMIN SERPL-MCNC: 3.8 G/DL (ref 3.4–5)
ALBUMIN/GLOB SERPL: 1.3 {RATIO} (ref 0.8–1.7)
ALP SERPL-CCNC: 89 U/L (ref 45–117)
ALT SERPL-CCNC: 19 U/L (ref 13–56)
ANION GAP SERPL CALC-SCNC: 8 MMOL/L (ref 3–18)
AST SERPL-CCNC: 18 U/L (ref 10–38)
BASOPHILS # BLD: 0.1 K/UL (ref 0–0.1)
BASOPHILS NFR BLD: 1 % (ref 0–2)
BILIRUB SERPL-MCNC: 0.4 MG/DL (ref 0.2–1)
BUN SERPL-MCNC: 12 MG/DL (ref 7–18)
BUN/CREAT SERPL: 18 (ref 12–20)
CALCIUM SERPL-MCNC: 9.4 MG/DL (ref 8.5–10.1)
CHLORIDE SERPL-SCNC: 106 MMOL/L (ref 100–111)
CO2 SERPL-SCNC: 28 MMOL/L (ref 21–32)
CREAT SERPL-MCNC: 0.67 MG/DL (ref 0.6–1.3)
DIFFERENTIAL METHOD BLD: ABNORMAL
EOSINOPHIL # BLD: 0.1 K/UL (ref 0–0.4)
EOSINOPHIL NFR BLD: 1 % (ref 0–5)
ERYTHROCYTE [DISTWIDTH] IN BLOOD BY AUTOMATED COUNT: 12.2 % (ref 11.6–14.5)
GLOBULIN SER CALC-MCNC: 2.9 G/DL (ref 2–4)
GLUCOSE SERPL-MCNC: 87 MG/DL (ref 74–99)
HCT VFR BLD AUTO: 38.8 % (ref 35–45)
HGB BLD-MCNC: 12.8 G/DL (ref 12–16)
IMM GRANULOCYTES # BLD AUTO: 0 K/UL (ref 0–0.04)
IMM GRANULOCYTES NFR BLD AUTO: 0 % (ref 0–0.5)
LIPASE SERPL-CCNC: 157 U/L (ref 73–393)
LYMPHOCYTES # BLD: 1.3 K/UL (ref 0.9–3.6)
LYMPHOCYTES NFR BLD: 15 % (ref 21–52)
MCH RBC QN AUTO: 29.8 PG (ref 24–34)
MCHC RBC AUTO-ENTMCNC: 33 G/DL (ref 31–37)
MCV RBC AUTO: 90.4 FL (ref 78–100)
MONOCYTES # BLD: 0.9 K/UL (ref 0.05–1.2)
MONOCYTES NFR BLD: 10 % (ref 3–10)
NEUTS SEG # BLD: 6.2 K/UL (ref 1.8–8)
NEUTS SEG NFR BLD: 73 % (ref 40–73)
NRBC # BLD: 0 K/UL (ref 0–0.01)
NRBC BLD-RTO: 0 PER 100 WBC
PLATELET # BLD AUTO: 212 K/UL (ref 135–420)
PMV BLD AUTO: 10.8 FL (ref 9.2–11.8)
POTASSIUM SERPL-SCNC: 4.3 MMOL/L (ref 3.5–5.5)
PROT SERPL-MCNC: 6.7 G/DL (ref 6.4–8.2)
RBC # BLD AUTO: 4.29 M/UL (ref 4.2–5.3)
SODIUM SERPL-SCNC: 142 MMOL/L (ref 136–145)
WBC # BLD AUTO: 8.6 K/UL (ref 4.6–13.2)

## 2022-09-07 PROCEDURE — 85025 COMPLETE CBC W/AUTO DIFF WBC: CPT

## 2022-09-07 PROCEDURE — 80053 COMPREHEN METABOLIC PANEL: CPT

## 2022-09-07 PROCEDURE — 36415 COLL VENOUS BLD VENIPUNCTURE: CPT

## 2022-09-07 PROCEDURE — 72070 X-RAY EXAM THORAC SPINE 2VWS: CPT

## 2022-09-07 PROCEDURE — 83690 ASSAY OF LIPASE: CPT

## 2022-09-07 PROCEDURE — 82784 ASSAY IGA/IGD/IGG/IGM EACH: CPT

## 2022-09-07 PROCEDURE — 99214 OFFICE O/P EST MOD 30 MIN: CPT | Performed by: FAMILY MEDICINE

## 2022-09-07 RX ORDER — FLUTICASONE PROPIONATE 50 MCG
1 SPRAY, SUSPENSION (ML) NASAL 2 TIMES DAILY
COMMUNITY
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
## 2022-09-09 ENCOUNTER — TELEPHONE (OUTPATIENT)
Dept: PHYSICAL THERAPY | Age: 57
End: 2022-09-09

## 2022-09-10 LAB
GLIADIN PEPTIDE IGA SER-ACNC: 3 UNITS (ref 0–19)
GLIADIN PEPTIDE IGG SER-ACNC: 1 UNITS (ref 0–19)
IGA SERPL-MCNC: 113 MG/DL (ref 87–352)
TTG IGA SER-ACNC: <2 U/ML (ref 0–3)
TTG IGG SER-ACNC: <2 U/ML (ref 0–5)

## 2022-09-12 RX ORDER — LOSARTAN POTASSIUM AND HYDROCHLOROTHIAZIDE 12.5; 5 MG/1; MG/1
1 TABLET ORAL DAILY
Qty: 90 TABLET | Refills: 0 | Status: SHIPPED | OUTPATIENT
Start: 2022-09-12

## 2022-09-12 NOTE — TELEPHONE ENCOUNTER
This pharmacy faxed over request for the following prescriptions to be filled:    Medication requested :   Requested Prescriptions     Pending Prescriptions Disp Refills    losartan-hydroCHLOROthiazide (HYZAAR) 50-12.5 mg per tablet 90 Tablet 0     Sig: Take 1 Tablet by mouth daily.      PCP: [address] [place] [address] or Print: [facility]  Mail order or Local pharmacy [place] [id number]    Scheduled appointment if not seen by current providers in office: LOV 9/7/2022 FU 10/11/2022

## 2022-09-13 ENCOUNTER — TELEPHONE (OUTPATIENT)
Dept: PHYSICAL THERAPY | Age: 57
End: 2022-09-13

## 2022-10-18 ENCOUNTER — HOSPITAL ENCOUNTER (OUTPATIENT)
Dept: PHYSICAL THERAPY | Age: 57
Discharge: HOME | End: 2022-10-18
Attending: FAMILY MEDICINE
Payer: COMMERCIAL

## 2022-10-18 PROCEDURE — 97110 THERAPEUTIC EXERCISES: CPT

## 2022-10-18 PROCEDURE — 97161 PT EVAL LOW COMPLEX 20 MIN: CPT

## 2022-10-18 PROCEDURE — 97535 SELF CARE MNGMENT TRAINING: CPT

## 2022-10-18 NOTE — PROGRESS NOTES
PT DAILY TREATMENT NOTE     Patient Name: Patricia Garland  OTDK:  : 1965  [x]  Patient  Verified  Payor: BLUE CROSS / Plan: [facility] PPO / Product Type: PPO /    In time:11:59  Out time:12:37  Total Treatment Time (min): 38  Visit #: 1 of 8    Medicare/BCBS Only   Total Timed Codes (min):  23 1:1 Treatment Time:  38       Treatment Area: Dorsalgia, unspecified [M54.9]    SUBJECTIVE  Pain Level (0-10 scale): 2  Any medication changes, allergies to medications, adverse drug reactions, diagnosis change, or new procedure performed?: [x] No    [] Yes (see summary sheet for update)  Subjective functional status/changes:   [] No changes reported  The pt reports limited pushing, activity in front of body and ambulation due to back pain    OBJECTIVE      15 min [x]Eval                  []Re-Eval       13 min Therapeutic Exercise:  [] See flow sheet :   Rationale: increase ROM and increase strength to improve the patients ability to perform daily tasks and recreation    10 min Self Care/Home Management: pt education on diagnosis, treatment strategies and self management   Rationale: increase ROM and reduce pain   to improve the patients ability to perform functional tasks           With   [] TE   [] TA   [] neuro   [] other: Patient Education: [x] Review HEP    [] Progressed/Changed HEP based on:   [] positioning   [] body mechanics   [] transfers   [] heat/ice application    [] other:      Other Objective/Functional Measures:      Pain Level (0-10 scale) post treatment: 3    ASSESSMENT/Changes in Function: see POC    Patient will continue to benefit from skilled PT services to modify and progress therapeutic interventions, address functional mobility deficits, address ROM deficits, address strength deficits, analyze and address soft tissue restrictions, analyze and cue movement patterns, and analyze and modify body mechanics/ergonomics to attain remaining goals.      [x]  See Plan of Care  []  See progress note/recertification  []  See Discharge Summary         Progress towards goals / Updated goals:  Short Term Goals: To be accomplished in 2 weeks:  Pt will demonstrate I and compliance with HEP to maximize therapeutic effect. IE: HEP issued and instructed  Pt will demonstrate MT & LT strength 4+/5 B to improve stability with exercise and ADLs. IE: 4/5 LT 4/5 MT left   Long Term Goals: To be accomplished in 4 weeks:  Pt will demonstrate QP activities without increased back pain to improve parascapular function with ADLs. IE: unable to tolerate resisted pushing  Pt will report 30 minute tolerance to ambulation/standing for improved quality of life and activity participation. IE: ~10 minutes  Pt will demonstrate modified pushups at table edge without pain increase to allow return to workouts. IE: unable to perform pushups  Pt will improve FOTO score to 58 to demonstrate improved quality of life and functional capacity.   IE: 40    PLAN  []  Upgrade activities as tolerated     [x]  Continue plan of care  []  Update interventions per flow sheet       []  Discharge due to:_  []  Other:_      Comfort Castillo DPT CMTPT 10/18/2022  1:28 PM    Future Appointments   Date Time Provider Vasile James   10/24/2022  3:45 PM Drew Pollard, PT [facility]   10/25/2022  6:45 AM [facility] RM 1 [facility]   10/28/2022  2:15 PM Drew Pollard, PT [facility]   11/1/2022  9:15 AM Cedrick Plaster, PT [facility]   11/3/2022  3:00 PM Ward Hock [facility]   11/7/2022  2:15 PM Thao Old, PT [facility]   11/9/2022  9:00 AM Drew Pollard, PT [facility]   11/14/2022 11:15 AM Trina Hock [facility]

## 2022-10-18 NOTE — PROGRESS NOTES
Request for use of Dry Needling/Intramuscular Manual Therapy  Patient: Ang Kendall     Referral Source: Amber Lee MD  Diagnosis: Dorsalgia, unspecified [M54.9]      : 1965  Date of initial visit: 10/18/2022   Attended visits: 1  Missed Visits: 0    Based on findings from the physical therapy examination and evaluation, the evaluating therapist believes the patientAng  would benefit from including Dry Needling as part of the plan of care. Dry needling is a treatment technique utilized in conjunction with other PT interventions to inactivate myofascial trigger points and the pain and dysfunction they cause. Dry Needling is an advanced procedure that requires additional training including greater than 54 hours of intensive course work. Physical Therapists at [address] [place] [address] are trained and/or certified through [employer] for their education. PROCEDURE:  Solid filament sterile needle (typically 0.3mm/30 gauge) inserted into a trigger point  Repeated movements inactivate the trigger points, taking 30-60 seconds per site  Typically consists of 1 dry needling session per week and a possible second treatment including muscle re-education, flexibility, strengthening and other manual techniques to facilitate the benefits of dry needling     BENEFITS:  Inactivation of trigger points  Decreased pain  Increased muscle length  Improved movement patterns  Restoration of function POTENTIAL RISKS:  Post-needling soreness  Infection  Bruising/bleeding  Penetration of a nerve  Pneumothorax   All treating PTs have been thoroughly educated in avoiding adverse reactions    If you agree with this recommendation, please sign this form and fax it to us at [phone]. If you have questions or concerns regarding dry needling or any other treatment we may be providing, please contact us at [phone]. Thank you for allowing us to assist in the care of your patient.   Hiral Castellon Shon Interiano DPT CMTPT    10/18/2022 1:42 PM     NOTE TO PHYSICIAN:  PLEASE COMPLETE THE ORDERS BELOW AND   FAX TO [facility]: [phone]  If you are unable to process this request in 24 hours please contact our office:   [phone]    I have read the above request and AGREE to the recommendation of including dry needling as part of the plan of care.       Physicians signature: _________________________Date: _________Time:________
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
## 2022-10-24 ENCOUNTER — HOSPITAL ENCOUNTER (OUTPATIENT)
Dept: PHYSICAL THERAPY | Age: 57
Discharge: HOME | End: 2022-10-24
Attending: FAMILY MEDICINE
Payer: COMMERCIAL

## 2022-10-24 PROCEDURE — 97110 THERAPEUTIC EXERCISES: CPT

## 2022-10-24 PROCEDURE — 20560 NDL INSJ W/O NJX 1 OR 2 MUSC: CPT

## 2022-10-24 PROCEDURE — 97112 NEUROMUSCULAR REEDUCATION: CPT

## 2022-10-24 PROCEDURE — 97140 MANUAL THERAPY 1/> REGIONS: CPT

## 2022-10-24 NOTE — PROGRESS NOTES
PT DAILY TREATMENT NOTE 10-18    Patient Name: Janiya Hurtado  Date:10/24/2022  : 1965  [x]  Patient  Verified  Payor: BLUE CROSS / Plan: [address] [place] [address] / Product Type: PPO /    In time:345  Out time:439  Total Treatment Time (min): 54  Visit #: 2 of 8    Medicare/BCBS Only   Total Timed Codes (min):  44 1:1 Treatment Time:  44       Treatment Area: Dorsalgia, unspecified [M54.9]    SUBJECTIVE  Pain Level (0-10 scale): 2  Any medication changes, allergies to medications, adverse drug reactions, diagnosis change, or new procedure performed?: [x] No    [] Yes (see summary sheet for update)  Subjective functional status/changes:   [] No changes reported  I did the exercises; that went fine because I'm somewhat fit. OBJECTIVE  Modality rationale: decrease pain to improve the patients ability to tolerate post needle soreness   Min Type Additional Details   10 [x]  Ice     []  heat  []  Ice massage  []  Laser   []  Anodyne Position:  Location:   [] Skin assessment post-treatment:  []intact []redness- no adverse reaction    []redness - adverse reaction:         15 min Therapeutic Exercise:  [x] See flow sheet :   Rationale: increase ROM to improve the patients ability to perform daily activities      17 min Neuromuscular Re-education:  [x]  See flow sheet :   Rationale: increase ROM and increase strength  to improve the patients ability to improve postural awareness    10 min Manual Therapy:  IMDN to include education, assessment, set-up, palpation, hemostasis, STM/stretch to treated areas and reassessment only   The manual therapy interventions were performed at a separate and distinct time from the therapeutic activities interventions.   Rationale: decrease pain, increase tissue extensibility, decrease trigger points, and increase postural awareness to relax mm for daily activities  2 min Dry Needling:   [x]  CPT 63407:  needle insertion(s) without injection(s); 1 or 2 muscle(s)  []  CPT 09207: needle insertion(s) without injection(s); 3 or more muscles. Rationale: decrease pain, increase ROM, increase tissue extensibility, decrease trigger points, and increase postural awareness to relax mm for upright positions    Dry Needling Procedure Note    Procedure: An intramuscular manual therapy (dry needling) and a neuro-muscular re-education treatment was done to deactivate myofascial trigger points with a 30 gauge filament needle under aseptic technique. Indications:  [x] Myofascial pain and dysfunction [] Muscled spasms  [x] Myalgia/myositis   [] Muscle cramps  [x] Muscle imbalances  [] Temporomandibular Dysfunction  [] Other:    Chart reviewed for the following:  Guanako SIDDIQUI PT, have reviewed the medical history, summary list and precautions/contraindications for [employer]   TIME OUT performed immediately prior to start of procedure:  Guanako SIDDIQUI PT, have performed the following reviews on [employer] prior to the start of the session:      [x] Verified patient identification by name and date of birth    [x] Agreement on all muscles being treated was verified   [x] Purpose of dry needling, side effects, possible complications, risks and benefits were explained to the patient   [x] Procedure site(s) verified  [x] Patient was positioned for comfort and draped for privacy  [x] Informed Consent was signed (initial visit) and verified verbally (subsequent visits)  [x] Patient was instructed on the need to report the use of blood thinners and/or immunosuppressant medications  [x] How to respond to possible adverse effects of treatment  [x] Self treatment of post needling soreness: ice, heat (moist heat, heat wraps) and stretching  [] Opportunity was given to ask any questions, all questions were answered            Time: 400  Date of procedure: 10/24/2022  Treatment: The following muscles were treated today with intramuscular dry needling  [] Left [] Right Masster  [] Left [] Right Temporalis  [] Left [] Right Zygomaticus Major / Minor  [] Left [] Right Lateral Pterygoid  [] Left [] Right Medial Pterygoid  [] Left [] Right Digastric Post / Anterior Belly  [] Left [] Right Sternocleidomastoid  [] Left [] Right Scalene Anterior / Medial / Posterior  [] Left [] Right Extra Laryngeal Muscles  [] Left [] Right Upper Trapezius  [] Left [] Right Middle Trapezius  [] Left [] Right Lower Trapezius  [] Left [] Right Oblique Capitis Inferior  [] Left [] Right Splenius Capitis / Cervicis  [] Left [] Right Semispinalis: Capitis / Cervicis  [x] Left [] Right Multifidi / Rotatores Cervicis / Thoracic  [x] Left [] Right Longissimus Thoracis / Illiocostalis  [] Left [] Right Levator Scapulae  [] Left [] Right Supraspinatus / Infraspinatus  [] Left [] Right Teres Major / Minor  [] Left [] Right Rhomboids / Serratus posterior superior  [] Left [] Right Pectoralis Major / Minor  [] Left [] Right Serratus Anterior  [] Left [] Right Latissimus Dorsi  [] Left [] Right Subscapularis  [] Left [] Right Coracobrachialis  [] Left [] Right Biceps Brachii  [] Left [] Right Deltoid: Anterior / Medial / Posterior  [] Left [] Right Brachialis  [] Left [] Right Triceps  [] Left [] Right Brachioradialis  [] Left [] Right Extensor Carpi Radialis Brevis / Extensor Carpi Radialis Longus    [] Left [] Right  Extensor digitorum  [] Left [] Right Supinator / Pronator Teres  [] Left [] Right Flexor Carpi Radialis/ Flexor Carpi Ulnaris   [] Left [] Right  Flexor Digitorum Superficialis/ Flexor Digitorum Profundus  [] Left [] Right Flexor Pollicis Longus / Flexor Pollicis Brevis / Palmaris Longus  [] Left [] Right Abductor Pollicis Longus / Abductor Pollicis Brevis  [] Left [] Right Opponens Pollicis / Adductor Pollicis  [] Left [] Right Dorsal / Palmar Interossei / Lumbricalis  [] Left [] Right Abductor Digiti Minimi / Opponens Digiti Minimi    Patient's response to today's treatment:  [x] Latent Twitch Response     [x] Muscle relaxation [] Pain Relief  [x] Post needling soreness    [x] without complications  [] Increased Range of Motion   [] Decreased headaches    [] Decreased Tinnitus  [] Other:     Performed by: Chayito Saxena, PT    With   [] TE   [] TA   [] neuro   [] other: Patient Education: [x] Review HEP    [] Progressed/Changed HEP based on:   [] positioning   [] body mechanics   [] transfers   [] heat/ice application    [] other:      Other Objective/Functional Measures:      Pain Level (0-10 scale) post treatment: 3    ASSESSMENT/Changes in Function: 1st follow with addition of DN today. Good tolerance to DN; able to elicit twitches along left T/S paraspinals. Patient will continue to benefit from skilled PT services to modify and progress therapeutic interventions, address functional mobility deficits, address ROM deficits, address strength deficits, analyze and address soft tissue restrictions, analyze and cue movement patterns, and assess and modify postural abnormalities to attain remaining goals. []  See Plan of Care  []  See progress note/recertification  []  See Discharge Summary         Progress towards goals / Updated goals:  Short Term Goals: To be accomplished in 2 weeks:  Pt will demonstrate I and compliance with HEP to maximize therapeutic effect. IE: HEP issued and instructed  Current:goal met 10/24/22  Pt will demonstrate MT & LT strength 4+/5 B to improve stability with exercise and ADLs. IE: 4/5 LT 4/5 MT left   Long Term Goals: To be accomplished in 4 weeks:  Pt will demonstrate QP activities without increased back pain to improve parascapular function with ADLs. IE: unable to tolerate resisted pushing  Pt will report 30 minute tolerance to ambulation/standing for improved quality of life and activity participation. IE: ~10 minutes  Pt will demonstrate modified pushups at table edge without pain increase to allow return to workouts.   IE: unable to perform pushups  Pt will improve FOTO score to 58 to demonstrate improved quality of life and functional capacity.   IE: 40    PLAN  [x]  Upgrade activities as tolerated     []  Continue plan of care  []  Update interventions per flow sheet       []  Discharge due to:_  []  Other:_      Albert Lama, MPT, CMTPT 10/24/2022  9:05 AM    Future Appointments   Date Time Provider Vasile Erika   10/24/2022  3:45 PM Asmita Muse, PT [facility]   10/25/2022  6:45 AM [facility] RM 1 [facility]   10/28/2022  2:15 PM Asmita Muse, PT [facility]   11/1/2022  9:15 AM Craig Enriquez, PT [facility]   11/3/2022  3:00 PM Umesh De Anda [facility]   11/7/2022  2:15 PM Jefry Aldana, PT [facility]   11/9/2022  9:00 AM Asmita Muse, PT [facility]   11/14/2022 11:15 AM Umesh De Anda [facility]

## 2022-10-25 ENCOUNTER — HOSPITAL ENCOUNTER (OUTPATIENT)
Age: 57
Discharge: HOME | End: 2022-10-25
Attending: FAMILY MEDICINE
Payer: COMMERCIAL

## 2022-10-25 DIAGNOSIS — M54.9 MID BACK PAIN: ICD-10-CM

## 2022-10-25 PROCEDURE — 72146 MRI CHEST SPINE W/O DYE: CPT

## 2022-10-28 ENCOUNTER — HOSPITAL ENCOUNTER (OUTPATIENT)
Dept: PHYSICAL THERAPY | Age: 57
Discharge: HOME | End: 2022-10-28
Attending: FAMILY MEDICINE
Payer: COMMERCIAL

## 2022-10-28 PROCEDURE — 97112 NEUROMUSCULAR REEDUCATION: CPT

## 2022-10-28 PROCEDURE — 97110 THERAPEUTIC EXERCISES: CPT

## 2022-10-28 NOTE — PROGRESS NOTES
PT DAILY TREATMENT NOTE 10-18    Patient Name: Viki Monsalve  Date:10/28/2022  : 1965  [x]  Patient  Verified  Payor: TAWNYA EVERETT / Plan: [address] [place] [address] / Product Type: PPO /    In time:212  Out time:252  Total Treatment Time (min): 40  Visit #: 3 of 8        Treatment Area: Dorsalgia, unspecified [M54.9]    SUBJECTIVE  Pain Level (0-10 scale): 0  Any medication changes, allergies to medications, adverse drug reactions, diagnosis change, or new procedure performed?: [x] No    [] Yes (see summary sheet for update)  Subjective functional status/changes:   [] No changes reported  I was in a lot of pain after the needling last time, but then it got better. I've been doing a lot at my house today too, and the pain hasn't come back. OBJECTIVE      10 min Therapeutic Exercise:  [x] See flow sheet :   Rationale: increase ROM and increase strength to improve the patients ability to perform daily activities      30 min Neuromuscular Re-education:  [x]  See flow sheet :   Rationale: increase ROM and increase strength  to improve the patients ability to recruit ant and post chain for daily activities     With   [] TE   [] TA   [] neuro   [] other: Patient Education: [x] Review HEP    [] Progressed/Changed HEP based on:   [] positioning   [] body mechanics   [] transfers   [] heat/ice application    [] other:      Other Objective/Functional Measures:      Pain Level (0-10 scale) post treatment: 0    ASSESSMENT/Changes in Function: Added exercise per flow sheet and held manual 2/2 no pain. Challenged with prone Y, but able to perform without UT compensation. Patient will continue to benefit from skilled PT services to modify and progress therapeutic interventions, address functional mobility deficits, address strength deficits, analyze and address soft tissue restrictions, analyze and cue movement patterns, and assess and modify postural abnormalities to attain remaining goals.      []  See Plan of Care  []  See progress note/recertification  []  See Discharge Summary         Progress towards goals / Updated goals:  Short Term Goals: To be accomplished in 2 weeks:  Pt will demonstrate I and compliance with HEP to maximize therapeutic effect. IE: HEP issued and instructed  Current:goal met 10/24/22  Pt will demonstrate MT & LT strength 4+/5 B to improve stability with exercise and ADLs. IE: 4/5 LT 4/5 MT left   Long Term Goals: To be accomplished in 4 weeks:  Pt will demonstrate QP activities without increased back pain to improve parascapular function with ADLs. IE: unable to tolerate resisted pushing  Pt will report 30 minute tolerance to ambulation/standing for improved quality of life and activity participation. IE: ~10 minutes  Pt will demonstrate modified pushups at table edge without pain increase to allow return to workouts. IE: unable to perform pushups  Pt will improve FOTO score to 58 to demonstrate improved quality of life and functional capacity.   IE: 40    PLAN  []  Upgrade activities as tolerated     [x]  Continue plan of care  []  Update interventions per flow sheet       []  Discharge due to:_  []  Other:_      David Dozier, DEL, CMTPT 10/28/2022  8:48 AM    Future Appointments   Date Time Provider Vasile James   10/28/2022  2:15 PM Mando Willard, PT [facility]   11/1/2022  9:15 AM Bianka Dewey, PT [facility]   11/3/2022  3:00 PM [place] Netter [facility]   11/7/2022  2:15 PM Yesika Carbone, PT [facility]   11/9/2022  9:00 AM Mando Willard, PT [facility]   11/14/2022 11:15 AM [place]ter [facility]   11/15/2022  9:00 AM Fawn Guerrero MD [facility]

## 2022-10-31 NOTE — PROGRESS NOTES
MRI show no signs of disc problem, continue with PT and ff-up after completion for reevaluation  Also, incidental finding of uterine fibroid (benign mass in the uterus), most of the time this is of no clincial concern, however if having abnormal vaginal bleeding or pelvic pain will recommend gyne evaluation, pls notify pt

## 2022-11-01 ENCOUNTER — HOSPITAL ENCOUNTER (OUTPATIENT)
Dept: PHYSICAL THERAPY | Age: 57
Discharge: HOME | End: 2022-11-01
Attending: FAMILY MEDICINE
Payer: COMMERCIAL

## 2022-11-01 PROCEDURE — 97112 NEUROMUSCULAR REEDUCATION: CPT

## 2022-11-01 PROCEDURE — 97140 MANUAL THERAPY 1/> REGIONS: CPT

## 2022-11-01 PROCEDURE — 97110 THERAPEUTIC EXERCISES: CPT

## 2022-11-01 NOTE — PROGRESS NOTES
PT DAILY TREATMENT NOTE     Patient Name: Tanmay Packer  Date:2022  : 1965  [x]  Patient  Verified  Payor: BLUE CROSS / Plan: [address] [place] [address] / Product Type: PPO /    In time:917  Out time:1007  Total Treatment Time (min): 50  Visit #: 4 of 8    Medicare/BCBS Only   Total Timed Codes (min):  50 1:1 Treatment Time:  43       Treatment Area: Dorsalgia, unspecified [M54.9]    SUBJECTIVE  Pain Level (0-10 scale): 1  Any medication changes, allergies to medications, adverse drug reactions, diagnosis change, or new procedure performed?: [x] No    [] Yes (see summary sheet for update)  Subjective functional status/changes:   [] No changes reported  The pt reports DN seemed to do the trick. She reports MRI \"showed nothing\" per MD. She reports her pain today is along the right shoulder blade region. OBJECTIVE        12 min Therapeutic Exercise:  [x] See flow sheet :   Rationale: increase ROM and increase strength to improve the patients ability to perform ADL       30 min Neuromuscular Re-education:  [x]  See flow sheet :   Rationale: increase strength, improve coordination, and increase proprioception  to improve the patients ability to perform functional tasks    8 min Manual Therapy:  pt prone performed MFR/TPR to the right rhomboids, t/s grade II/III rib springs and MFR to the mid t/s paraspinals   The manual therapy interventions were performed at a separate and distinct time from the therapeutic activities interventions. Rationale: decrease pain, increase tissue extensibility, and decrease trigger points to to perform functional tasks.              With   [] TE   [] TA   [] neuro   [] other: Patient Education: [x] Review HEP    [] Progressed/Changed HEP based on:   [] positioning   [] body mechanics   [] transfers   [] heat/ice application    [] other:      Other Objective/Functional Measures: added SA push ups on plyobox     Pain Level (0-10 scale) post treatment: 1    ASSESSMENT/Changes in Function: The pt reports benefit from DN session but states she was sore for 4 days following. She has met LTG3 and is progressing with therex. Patient will continue to benefit from skilled PT services to modify and progress therapeutic interventions, address functional mobility deficits, address ROM deficits, address strength deficits, analyze and address soft tissue restrictions, analyze and cue movement patterns, and analyze and modify body mechanics/ergonomics to attain remaining goals. []  See Plan of Care  []  See progress note/recertification  []  See Discharge Summary         Progress towards goals / Updated goals:  Short Term Goals: To be accomplished in 2 weeks:  Pt will demonstrate I and compliance with HEP to maximize therapeutic effect. IE: HEP issued and instructed  Current:goal met 10/24/22  Pt will demonstrate MT & LT strength 4+/5 B to improve stability with exercise and ADLs. IE: 4/5 LT 4/5 MT left   Long Term Goals: To be accomplished in 4 weeks:  Pt will demonstrate QP activities without increased back pain to improve parascapular function with ADLs. IE: unable to tolerate resisted pushing  Pt will report 30 minute tolerance to ambulation/standing for improved quality of life and activity participation. IE: ~10 minutes  Current: pt reports she has not yet attempted 11-1-22  Pt will demonstrate modified pushups at table edge without pain increase to allow return to workouts. IE: unable to perform pushups  Current: MET performed push up + on plyobox without complaint on 11-1-22  Pt will improve FOTO score to 58 to demonstrate improved quality of life and functional capacity.   IE: 40       PLAN  [x]  Upgrade activities as tolerated     []  Continue plan of care  []  Update interventions per flow sheet       []  Discharge due to:_  []  Other:_      Ramiro Wing, PT 11/1/2022  9:30 AM    Future Appointments   Date Time Provider Vasile James   11/3/2022 3:00 PM Luis Armando Jay [facility]   11/7/2022  2:15 PM Vonnie Reynoso, PT [facility]   11/9/2022  9:00 AM Joshua Musa, PT [facility]   11/14/2022 11:15 AM Luis Armando Jay [facility]   11/15/2022  9:00 AM Willy Lugo MD [facility]

## 2022-11-03 ENCOUNTER — HOSPITAL ENCOUNTER (OUTPATIENT)
Dept: PHYSICAL THERAPY | Age: 57
Discharge: HOME | End: 2022-11-03
Attending: FAMILY MEDICINE
Payer: COMMERCIAL

## 2022-11-03 PROCEDURE — 97140 MANUAL THERAPY 1/> REGIONS: CPT

## 2022-11-03 PROCEDURE — 97112 NEUROMUSCULAR REEDUCATION: CPT

## 2022-11-03 PROCEDURE — 97110 THERAPEUTIC EXERCISES: CPT

## 2022-11-03 NOTE — PROGRESS NOTES
PT DAILY TREATMENT NOTE     Patient Name: Meg Armijo  Date:11/3/2022  : 1965  [x]  Patient  Verified  Payor: BLUE CROSS / Plan: [address] [place] [address] / Product Type: PPO /    In time:3:00  Out time:3:42  Total Treatment Time (min): 42  Visit #: 5 of 8    Medicare/BCBS Only   Total Timed Codes (min):  42 1:1 Treatment Time:  42       Treatment Area: Dorsalgia, unspecified [M54.9]    SUBJECTIVE  Pain Level (0-10 scale):  2  Any medication changes, allergies to medications, adverse drug reactions, diagnosis change, or new procedure performed?: [x] No    [] Yes (see summary sheet for update)  Subjective functional status/changes:   [] No changes reported  The pt declines DN today due to having a dinner party tonight and not wanting to be sore    OBJECTIVE    Modality rationale: PD  to improve the patients ability to    Min Type Additional Details    [] Estim:  []Unatt       []IFC  []Premod                        []Other:  []w/ice   []w/heat  Position:  Location:    [] Estim: []Att    []TENS instruct  []NMES                    []Other:  []w/US   []w/ice   []w/heat  Position:  Location:    []  Traction: [] Cervical       []Lumbar                       [] Prone          []Supine                       []Intermittent   []Continuous Lbs:  [] before manual  [] after manual    []  Ultrasound: []Continuous   [] Pulsed                           []1MHz   []3MHz W/cm2:  Location:    []  Iontophoresis with dexamethasone         Location: [] Take home patch   [] In clinic    []  Ice     []  heat  []  Ice massage  []  Laser   []  Anodyne Position:  Location:    []  Laser with stim  []  Other:  Position:  Location:    []  Vasopneumatic Device    []  Right     []  Left  Pre-treatment girth:  Post-treatment girth:  Measured at (location):  Pressure:       [] lo [] med [] hi   Temperature: [] lo [] med [] hi   [] Skin assessment post-treatment:  []intact []redness- no adverse reaction    []redness - adverse reaction:     18 min Therapeutic Exercise:  [] See flow sheet :   Rationale: increase ROM and increase strength to improve the patients ability to perform daily tasks      14 min Neuromuscular Re-education:  []  See flow sheet :   Rationale: increase ROM, increase strength, and increase proprioception  to improve the patients ability to perform functional tasks with improved scapulothoracic stability and mechanics    10 min Manual Therapy:  T/S PAs and rib springs grade III-IV, thoracic traction, TPR/IASTM right thoracic paraspinals   The manual therapy interventions were performed at a separate and distinct time from the therapeutic activities interventions. Rationale: decrease pain, increase ROM, and increase tissue extensibility to improve ease of ADLs and exercise          With   [] TE   [] TA   [] neuro   [] other: Patient Education: [x] Review HEP    [] Progressed/Changed HEP based on:   [] positioning   [] body mechanics   [] transfers   [] heat/ice application    [] other:      Other Objective/Functional Measures:      Pain Level (0-10 scale) post treatment: 0-1    ASSESSMENT/Changes in Function: The pt demonstrates less irritability and TTP through scapular and thoracic musculature. More tender on right thoracic paraspinals today than left. Improved pain reported post session. Continue with thoracic mobility work and load tolerance through scapular musculature. May plan to trial QP next week if left knee less sore after fall in driveway this week    Patient will continue to benefit from skilled PT services to modify and progress therapeutic interventions, address functional mobility deficits, address ROM deficits, address strength deficits, analyze and address soft tissue restrictions, analyze and cue movement patterns, and analyze and modify body mechanics/ergonomics to attain remaining goals.      []  See Plan of Care  []  See progress note/recertification  []  See Discharge Summary         Progress towards goals / Updated goals:  Short Term Goals: To be accomplished in 2 weeks:  Pt will demonstrate I and compliance with HEP to maximize therapeutic effect. IE: HEP issued and instructed  Current:goal met 10/24/22  Pt will demonstrate MT & LT strength 4+/5 B to improve stability with exercise and ADLs. IE: 4/5 LT 4/5 MT left   Current: Met 4+/5 11/3/2022  Long Term Goals: To be accomplished in 4 weeks:  Pt will demonstrate QP activities without increased back pain to improve parascapular function with ADLs. IE: unable to tolerate resisted pushing  Pt will report 30 minute tolerance to ambulation/standing for improved quality of life and activity participation. IE: ~10 minutes  Current: pt reports she has not yet attempted 11-1-22  Pt will demonstrate modified pushups at table edge without pain increase to allow return to workouts. IE: unable to perform pushups  Current: MET performed push up + on plyobox without complaint on 11-1-22  Pt will improve FOTO score to 58 to demonstrate improved quality of life and functional capacity.   IE: 40    PLAN  [x]  Upgrade activities as tolerated     []  Continue plan of care  []  Update interventions per flow sheet       []  Discharge due to:_  []  Other:_      Carl Wagoner DPT CMTPT 11/3/2022  3:10 PM    Future Appointments   Date Time Provider Vasile James   11/7/2022  2:15 PM [facility]   11/9/2022  9:00 AM Jessica Melgar, PT [facility]   11/14/2022 11:15 AM [facility] Delgadillo [facility]   11/15/2022  9:00 AM Eagle Kenney MD [facility]

## 2022-11-04 ENCOUNTER — TELEPHONE (OUTPATIENT)
Dept: PHYSICAL THERAPY | Age: 57
End: 2022-11-04

## 2022-11-07 ENCOUNTER — APPOINTMENT (OUTPATIENT)
Dept: PHYSICAL THERAPY | Age: 57
End: 2022-11-07
Attending: FAMILY MEDICINE
Payer: COMMERCIAL

## 2022-11-07 RX ORDER — PANTOPRAZOLE SODIUM 40 MG/1
40 TABLET, DELAYED RELEASE ORAL DAILY
Qty: 90 TABLET | Refills: 0 | OUTPATIENT
Start: 2022-11-07

## 2022-11-07 RX ORDER — PHENOL/SODIUM PHENOLATE
20 AEROSOL, SPRAY (ML) MUCOUS MEMBRANE DAILY
Qty: 90 TABLET | Refills: 0 | Status: SHIPPED | OUTPATIENT
Start: 2022-11-07

## 2022-11-07 NOTE — PROGRESS NOTES
Patient identified with 2 identifiers (name and ).   Patient aware of MRI show no signs of disc problem, continue with PT and ff-up after completion for reevaluation   Also, incidental finding of uterine fibroid (benign mass in the uterus), most of the time this is of no clincial concern, however if having abnormal vaginal bleeding or pelvic pain will recommend gyne evaluation,

## 2022-11-07 NOTE — TELEPHONE ENCOUNTER
Patient identified with 2 identifiers (name and ). Called patients about her results and she states she needs a refill on her Protonix. Last OV 2022  Next OV none scheduled   Patient states she will schedule after PT  Patient states she had run out of the protonix and been taking prilosec otc which seems to be working better. She feels her voice is less hoarse. Please advise. Would like to try and get prescription for the prilosec.

## 2022-11-09 ENCOUNTER — TELEPHONE (OUTPATIENT)
Dept: PHYSICAL THERAPY | Age: 57
End: 2022-11-09

## 2022-11-14 ENCOUNTER — APPOINTMENT (OUTPATIENT)
Dept: PHYSICAL THERAPY | Age: 57
End: 2022-11-14
Attending: FAMILY MEDICINE
Payer: COMMERCIAL

## 2022-11-15 ENCOUNTER — OFFICE VISIT (OUTPATIENT)
Dept: ORTHOPEDIC SURGERY | Age: 57
End: 2022-11-15
Payer: COMMERCIAL

## 2022-11-15 VITALS — TEMPERATURE: 96.2 F | WEIGHT: 152.4 LBS | OXYGEN SATURATION: 98 % | HEART RATE: 70 BPM | BODY MASS INDEX: 26.16 KG/M2

## 2022-11-15 DIAGNOSIS — M54.6 CHRONIC BILATERAL THORACIC BACK PAIN: ICD-10-CM

## 2022-11-15 DIAGNOSIS — G89.29 CHRONIC BILATERAL THORACIC BACK PAIN: ICD-10-CM

## 2022-11-15 DIAGNOSIS — M79.18 MUSCULOSKELETAL PAIN: ICD-10-CM

## 2022-11-15 DIAGNOSIS — M79.18 MYOFASCIAL PAIN: Primary | ICD-10-CM

## 2022-11-15 PROBLEM — I10 ESSENTIAL (PRIMARY) HYPERTENSION: Status: ACTIVE | Noted: 2020-01-02

## 2022-11-15 PROBLEM — R87.610 ATYPICAL SQUAMOUS CELLS OF UNDETERMINED SIGNIFICANCE ON CYTOLOGIC SMEAR OF CERVIX (ASC-US): Status: ACTIVE | Noted: 2019-03-28

## 2022-11-15 PROBLEM — S82.65XA: Status: ACTIVE | Noted: 2017-06-26

## 2022-11-15 PROBLEM — K92.2 GASTROINTESTINAL HEMORRHAGE, UNSPECIFIED: Status: ACTIVE | Noted: 2017-03-03

## 2022-11-15 PROBLEM — N95.0 POSTMENOPAUSAL BLEEDING: Status: ACTIVE | Noted: 2019-02-25

## 2022-11-15 PROBLEM — R07.2 PRECORDIAL PAIN: Status: ACTIVE | Noted: 2020-10-07

## 2022-11-15 PROBLEM — R06.00 DYSPNEA, UNSPECIFIED: Status: ACTIVE | Noted: 2019-06-10

## 2022-11-15 PROCEDURE — 99204 OFFICE O/P NEW MOD 45 MIN: CPT | Performed by: PHYSICAL MEDICINE & REHABILITATION

## 2022-11-15 RX ORDER — PANTOPRAZOLE SODIUM 40 MG/1
TABLET, DELAYED RELEASE ORAL
COMMUNITY

## 2022-11-30 ENCOUNTER — APPOINTMENT (OUTPATIENT)
Dept: PHYSICAL THERAPY | Age: 57
End: 2022-11-30
Attending: FAMILY MEDICINE
Payer: COMMERCIAL

## 2022-11-30 ENCOUNTER — TELEPHONE (OUTPATIENT)
Dept: PHYSICAL THERAPY | Age: 57
End: 2022-11-30

## 2022-12-02 ENCOUNTER — APPOINTMENT (OUTPATIENT)
Dept: PHYSICAL THERAPY | Age: 57
End: 2022-12-02
Attending: FAMILY MEDICINE

## 2022-12-21 RX ORDER — LOSARTAN POTASSIUM AND HYDROCHLOROTHIAZIDE 12.5; 5 MG/1; MG/1
TABLET ORAL
Qty: 90 TABLET | Refills: 0 | Status: SHIPPED | OUTPATIENT
Start: 2022-12-21

## 2023-02-06 RX ORDER — OMEPRAZOLE 20 MG/1
CAPSULE, DELAYED RELEASE ORAL
Qty: 90 CAPSULE | Refills: 0 | Status: SHIPPED | OUTPATIENT
Start: 2023-02-06

## 2023-02-27 ENCOUNTER — TRANSCRIBE ORDERS (OUTPATIENT)
Facility: HOSPITAL | Age: 58
End: 2023-02-27

## 2023-02-27 DIAGNOSIS — R10.10 UPPER ABDOMINAL PAIN: Primary | ICD-10-CM

## 2023-03-07 ENCOUNTER — HOSPITAL ENCOUNTER (OUTPATIENT)
Facility: HOSPITAL | Age: 58
Discharge: HOME | End: 2023-03-10
Payer: COMMERCIAL

## 2023-03-07 DIAGNOSIS — R10.10 UPPER ABDOMINAL PAIN: ICD-10-CM

## 2023-03-07 PROCEDURE — 2580000003 HC RX 258: Performed by: INTERNAL MEDICINE

## 2023-03-07 PROCEDURE — 6360000004 HC RX CONTRAST MEDICATION: Performed by: INTERNAL MEDICINE

## 2023-03-07 PROCEDURE — A9537 TC99M MEBROFENIN: HCPCS | Performed by: INTERNAL MEDICINE

## 2023-03-07 PROCEDURE — 78227 HEPATOBIL SYST IMAGE W/DRUG: CPT

## 2023-03-07 PROCEDURE — 3430000000 HC RX DIAGNOSTIC RADIOPHARMACEUTICAL: Performed by: INTERNAL MEDICINE

## 2023-03-07 RX ORDER — SODIUM CHLORIDE 9 MG/ML
INJECTION, SOLUTION INTRAVENOUS CONTINUOUS
Status: DISCONTINUED | OUTPATIENT
Start: 2023-03-07 | End: 2023-03-11 | Stop reason: HOSPADM

## 2023-03-07 RX ORDER — KIT FOR THE PREPARATION OF TECHNETIUM TC 99M MEBROFENIN 45 MG/10ML
6.6 INJECTION, POWDER, LYOPHILIZED, FOR SOLUTION INTRAVENOUS
Status: COMPLETED | OUTPATIENT
Start: 2023-03-07 | End: 2023-03-07

## 2023-03-07 RX ADMIN — MEBROFENIN 6.6 MILLICURIE: 45 INJECTION, POWDER, LYOPHILIZED, FOR SOLUTION INTRAVENOUS at 06:58

## 2023-03-07 RX ADMIN — SODIUM CHLORIDE: 900 INJECTION, SOLUTION INTRAVENOUS at 08:00

## 2023-03-07 RX ADMIN — SINCALIDE 1.39 MCG: 5 INJECTION, POWDER, LYOPHILIZED, FOR SOLUTION INTRAVENOUS at 08:00

## 2023-04-02 RX ORDER — LOSARTAN POTASSIUM AND HYDROCHLOROTHIAZIDE 12.5; 5 MG/1; MG/1
TABLET ORAL
Qty: 90 TABLET | Refills: 1 | Status: SHIPPED | OUTPATIENT
Start: 2023-04-02

## 2023-05-12 NOTE — TELEPHONE ENCOUNTER
Last OV: 9/7/22  Last labs:9/7/22  Next OV and labs: None  Last refill: 2/6/2023    1 tab daily  90 tabs    Attempted to contact pt for f/u appointment will try again

## 2023-05-14 RX ORDER — OMEPRAZOLE 20 MG/1
CAPSULE, DELAYED RELEASE ORAL
Qty: 90 CAPSULE | Refills: 0 | Status: SHIPPED | OUTPATIENT
Start: 2023-05-14

## 2023-06-26 ENCOUNTER — TELEPHONE (OUTPATIENT)
Age: 58
End: 2023-06-26

## 2023-07-03 NOTE — TELEPHONE ENCOUNTER
Spoke with Reynaldo Bryant whom confirmed that message was received from [facility].  However she could not make June 29,2023, virtual is better for her, and has been scheduled for July 05,2023 @ 10:30 am as a virtual.

## 2023-07-03 NOTE — TELEPHONE ENCOUNTER
"Lyle Payton is a 50 y.o. female here for a non-provider visit for:   TDAP    Reason for immunization: Overdue/Provider Recommended  Immunization records indicate need for vaccine: Yes, confirmed with Epic  Minimum interval has been met for this vaccine: Yes  ABN completed: Not Indicated    VIS Dated 8/6/21 was given to patient: Yes  All IAC Questionnaire questions were answered \"No.\"    Patient tolerated injection and no adverse effects were observed or reported: Yes    Pt scheduled for next dose in series: No   " Due for ff-up soon, pls sched

## 2023-07-05 ENCOUNTER — TELEMEDICINE (OUTPATIENT)
Age: 58
End: 2023-07-05
Payer: COMMERCIAL

## 2023-07-05 DIAGNOSIS — Z13.1 SCREENING FOR DIABETES MELLITUS (DM): ICD-10-CM

## 2023-07-05 DIAGNOSIS — K76.0 FATTY LIVER: ICD-10-CM

## 2023-07-05 DIAGNOSIS — Z83.6 FAMILY HISTORY OF PULMONARY FIBROSIS: ICD-10-CM

## 2023-07-05 DIAGNOSIS — I10 ESSENTIAL HYPERTENSION: Primary | ICD-10-CM

## 2023-07-05 DIAGNOSIS — J98.4 SCARRING OF LUNG: ICD-10-CM

## 2023-07-05 PROBLEM — R87.610 ATYPICAL SQUAMOUS CELLS OF UNDETERMINED SIGNIFICANCE (ASCUS) ON PAPANICOLAOU SMEAR OF CERVIX: Status: ACTIVE | Noted: 2019-03-28

## 2023-07-05 PROCEDURE — 99214 OFFICE O/P EST MOD 30 MIN: CPT | Performed by: FAMILY MEDICINE

## 2023-07-05 RX ORDER — DICYCLOMINE HCL 20 MG
20 TABLET ORAL 4 TIMES DAILY
COMMUNITY
Start: 2023-06-22 | End: 2023-07-05

## 2023-07-05 SDOH — ECONOMIC STABILITY: INCOME INSECURITY: HOW HARD IS IT FOR YOU TO PAY FOR THE VERY BASICS LIKE FOOD, HOUSING, MEDICAL CARE, AND HEATING?: NOT HARD AT ALL

## 2023-07-05 SDOH — ECONOMIC STABILITY: FOOD INSECURITY: WITHIN THE PAST 12 MONTHS, THE FOOD YOU BOUGHT JUST DIDN'T LAST AND YOU DIDN'T HAVE MONEY TO GET MORE.: NEVER TRUE

## 2023-07-05 SDOH — ECONOMIC STABILITY: TRANSPORTATION INSECURITY
IN THE PAST 12 MONTHS, HAS LACK OF TRANSPORTATION KEPT YOU FROM MEETINGS, WORK, OR FROM GETTING THINGS NEEDED FOR DAILY LIVING?: NO

## 2023-07-05 SDOH — ECONOMIC STABILITY: FOOD INSECURITY: WITHIN THE PAST 12 MONTHS, YOU WORRIED THAT YOUR FOOD WOULD RUN OUT BEFORE YOU GOT MONEY TO BUY MORE.: NEVER TRUE

## 2023-07-05 SDOH — ECONOMIC STABILITY: HOUSING INSECURITY
IN THE LAST 12 MONTHS, WAS THERE A TIME WHEN YOU DID NOT HAVE A STEADY PLACE TO SLEEP OR SLEPT IN A SHELTER (INCLUDING NOW)?: NO

## 2023-07-05 ASSESSMENT — PATIENT HEALTH QUESTIONNAIRE - PHQ9
SUM OF ALL RESPONSES TO PHQ QUESTIONS 1-9: 0
10. IF YOU CHECKED OFF ANY PROBLEMS, HOW DIFFICULT HAVE THESE PROBLEMS MADE IT FOR YOU TO DO YOUR WORK, TAKE CARE OF THINGS AT HOME, OR GET ALONG WITH OTHER PEOPLE: 0
2. FEELING DOWN, DEPRESSED OR HOPELESS: 0
SUM OF ALL RESPONSES TO PHQ QUESTIONS 1-9: 0
3. TROUBLE FALLING OR STAYING ASLEEP: 0
9. THOUGHTS THAT YOU WOULD BE BETTER OFF DEAD, OR OF HURTING YOURSELF: 0
4. FEELING TIRED OR HAVING LITTLE ENERGY: 0
8. MOVING OR SPEAKING SO SLOWLY THAT OTHER PEOPLE COULD HAVE NOTICED. OR THE OPPOSITE, BEING SO FIGETY OR RESTLESS THAT YOU HAVE BEEN MOVING AROUND A LOT MORE THAN USUAL: 0
SUM OF ALL RESPONSES TO PHQ QUESTIONS 1-9: 0
SUM OF ALL RESPONSES TO PHQ QUESTIONS 1-9: 0
1. LITTLE INTEREST OR PLEASURE IN DOING THINGS: 0
6. FEELING BAD ABOUT YOURSELF - OR THAT YOU ARE A FAILURE OR HAVE LET YOURSELF OR YOUR FAMILY DOWN: 0
5. POOR APPETITE OR OVEREATING: 0
7. TROUBLE CONCENTRATING ON THINGS, SUCH AS READING THE NEWSPAPER OR WATCHING TELEVISION: 0
SUM OF ALL RESPONSES TO PHQ9 QUESTIONS 1 & 2: 0

## 2023-07-05 NOTE — PROGRESS NOTES
1. \"Have you been to the ER, urgent care clinic since your last visit? Hospitalized since your last visit? \" Yes June 22,2023 [facility] ED for abdominal pain     2. \"Have you seen or consulted any other health care providers outside of the [address] [place] [address] since your last visit? \" No     3. For patients aged 43-73: Has the patient had a colonoscopy / FIT/ Cologuard? Yes - no Care Gap present      If the patient is female:    4. For patients aged 43-66: Has the patient had a mammogram within the past 2 years? Yes - no Care Gap present      5. For patients aged 21-65: Has the patient had a pap smear?  Yes - no Care Gap present 10/17/2021

## 2023-07-05 NOTE — PROGRESS NOTES
Va Salazar is a 61 y.o. female who was seen by synchronous (real-time) audio-video technology on 7/5/2023. Consent: Va Salazar, who was seen by synchronous (real-time) audio-video technology, and/or her healthcare decision maker, is aware that this patient-initiated, Telehealth encounter on 7/5/2023 is a billable service, with coverage as determined by her insurance carrier. She is aware that she may receive a bill and has provided verbal consent to proceed: Yes. Va Salazar, was evaluated through a synchronous (real-time) audio-video encounter. The patient (or guardian if applicable) is aware that this is a billable service, which includes applicable co-pays. This Virtual Visit was conducted with patient's (and/or legal guardian's) consent. Patient identification was verified, and a caregiver was present when appropriate. The patient was located at Home: [address] [place] [address] [address] [id number]  Provider was [facility] at [facility] [address]  [facility],  [address] [facility]       --Rodríguez Aldrich MD on 7/5/2023 at 12:44 PM    An electronic signature was used to authenticate this note. [id number]  Subjective:   Va Salazar is a 61 y.o. female who was seen for Abdominal Pain (Follow ED visit ) and Results (Review of results )    Constant dull ache on RUQ, not related to meals, position, Bms. No fever, nausea, BM changes, constipation. Had cholecystectomy done for chronic RUQ pain and felt immediately better from surgery 4/2023 however pain developed and persistent for a week hence ED visit, reviewed CT scan negative for acute findings, uterine fibroid and fatty liver seen with incidental finding of lung base scarring. She denies any SOB. Chest pain. She reports mother with pulmonary fibrosis. She is a nonsmoker, remote hx of asbestos exposure, has had pneumonia infrequently  She is not pain free, spontaneous resolution.  Says did not take bentyl given in the ED> has appt

## 2023-07-25 ENCOUNTER — HOSPITAL ENCOUNTER (OUTPATIENT)
Facility: HOSPITAL | Age: 58
Discharge: HOME | End: 2023-07-28
Attending: FAMILY MEDICINE
Payer: COMMERCIAL

## 2023-07-25 DIAGNOSIS — Z83.6 FAMILY HISTORY OF PULMONARY FIBROSIS: ICD-10-CM

## 2023-07-25 DIAGNOSIS — J98.4 SCARRING OF LUNG: ICD-10-CM

## 2023-07-25 PROCEDURE — 71250 CT THORAX DX C-: CPT

## 2023-08-03 ENCOUNTER — OFFICE VISIT (OUTPATIENT)
Age: 58
End: 2023-08-03
Payer: COMMERCIAL

## 2023-08-03 VITALS
HEART RATE: 84 BPM | BODY MASS INDEX: 26.12 KG/M2 | RESPIRATION RATE: 16 BRPM | HEIGHT: 64 IN | TEMPERATURE: 99.1 F | WEIGHT: 153 LBS | SYSTOLIC BLOOD PRESSURE: 110 MMHG | OXYGEN SATURATION: 99 % | DIASTOLIC BLOOD PRESSURE: 64 MMHG

## 2023-08-03 DIAGNOSIS — F43.23 ADJUSTMENT DISORDER WITH MIXED ANXIETY AND DEPRESSED MOOD: ICD-10-CM

## 2023-08-03 DIAGNOSIS — E04.1 THYROID NODULE: ICD-10-CM

## 2023-08-03 DIAGNOSIS — I10 ESSENTIAL HYPERTENSION: Primary | ICD-10-CM

## 2023-08-03 PROCEDURE — 3074F SYST BP LT 130 MM HG: CPT | Performed by: FAMILY MEDICINE

## 2023-08-03 PROCEDURE — 3078F DIAST BP <80 MM HG: CPT | Performed by: FAMILY MEDICINE

## 2023-08-03 PROCEDURE — 99214 OFFICE O/P EST MOD 30 MIN: CPT | Performed by: FAMILY MEDICINE

## 2023-08-03 NOTE — PROGRESS NOTES
1. \"Have you been to the ER, urgent care clinic since your last visit? Hospitalized since your last visit? \" No    2. \"Have you seen or consulted any other health care providers outside of the [address] [place] [address] since your last visit? \" No     3. For patients aged 43-73: Has the patient had a colonoscopy / FIT/ Cologuard? Yes - no Care Gap present      If the patient is female:    4. For patients aged 43-66: Has the patient had a mammogram within the past 2 years? Yes - no Care Gap present      5. For patients aged 21-65: Has the patient had a pap smear?  NA - based on age or sex

## 2023-08-03 NOTE — PROGRESS NOTES
Abdelrahman Every, 60 y.o.,  female    SUBJECTIVE  Ff-up    HTN- taking hyzaar, normotensive home readings. Labs not done yet, says feeling overwhelmed. 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 Allergic rhinitis- worse this time of year, no sob. wheezing    CT lung reviewed, with mother having hx pulmonary fibrosis. No sob, wheezing. Stays active  Small thyroid nodules  Minimal linear fibrosis  Minimal atherosclerosis coronary arteries    ROS:  See HPI, all others negative        Patient Active Problem List   Diagnosis Code    Essential hypertension I10    Allergic rhinitis J30.9         Current Outpatient Medications:     sertraline (ZOLOFT) 50 MG tablet, Take 1 tablet by mouth daily, Disp: 90 tablet, Rfl: 0    losartan-hydroCHLOROthiazide (HYZAAR) 50-12.5 MG per tablet, TAKE 1 TABLET BY MOUTH EVERY DAY, Disp: 90 tablet, Rfl: 1    pantoprazole (PROTONIX) 40 MG tablet, Take by mouth, Disp: , Rfl:       Allergies   Allergen Reactions    Onion Other (comments)       Past Medical History:   Diagnosis Date    Hypertension        Social History     Socioeconomic History    Marital status:      Spouse name: Not on file    Number of children: Not on file    Years of education: Not on file    Highest education level: Not on file   Occupational History    Not on file   Tobacco Use    Smoking status: Never    Smokeless tobacco: Never   Substance and Sexual Activity    Alcohol use:  Yes     Alcohol/week: 5.0 standard drinks     Types: 5 Glasses of wine per week    Drug use: Not Currently    Sexual activity: Yes   Other Topics Concern    Not on file   Social History Narrative    Not on file     Social Determinants of Health     Financial Resource Strain: Not on file   Food Insecurity: Not on file   Transportation Needs: Not on file   Physical Activity: Not on file   Stress: Not on file   Social Connections: Not on file   Intimate Partner Violence:

## 2023-10-16 RX ORDER — LOSARTAN POTASSIUM AND HYDROCHLOROTHIAZIDE 12.5; 5 MG/1; MG/1
TABLET ORAL
Qty: 90 TABLET | Refills: 1 | Status: SHIPPED | OUTPATIENT
Start: 2023-10-16

## 2023-10-31 NOTE — TELEPHONE ENCOUNTER
LOV 08/03/2023   F/U - Left  for appt scheduling. Patient was supposed to follow up in 6 weeks to see how she was doing after being started on zoloft. She has not been seen. If patient calls back as if she is doing ok on zoloft and schedule for follow up.

## 2024-02-08 NOTE — TELEPHONE ENCOUNTER
Last OV 08/03/2023  Next OV none scheduled  Advised:   Return in about 6 weeks (around 9/14/2023  Last lab that you order 2022

## 2024-03-08 NOTE — TELEPHONE ENCOUNTER
This patient contacted the office for the following prescriptions to be refilled:    Medication requested :   Requested Prescriptions     Pending Prescriptions Disp Refills    sertraline (ZOLOFT) 50 MG tablet [Pharmacy Med Name: SERTRALINE HCL 50 MG TABLET] 30 tablet 0     Sig: TAKE 1 TABLET BY MOUTH EVERY DAY        Last Refilled: 2/12/2024    Last Office Visit: 8/3/2023    Next Office Visit: 4/9/2024    Last Labs: 9/7/2022

## 2024-04-05 ENCOUNTER — HOSPITAL ENCOUNTER (OUTPATIENT)
Facility: HOSPITAL | Age: 59
Discharge: HOME | End: 2024-04-05
Payer: COMMERCIAL

## 2024-04-05 LAB
ALBUMIN SERPL-MCNC: 4.1 G/DL (ref 3.4–5)
ALBUMIN/GLOB SERPL: 1.3 (ref 0.8–1.7)
ALP SERPL-CCNC: 105 U/L (ref 45–117)
ALT SERPL-CCNC: 43 U/L (ref 13–56)
ANION GAP SERPL CALC-SCNC: 2 MMOL/L (ref 3–18)
AST SERPL-CCNC: 39 U/L (ref 10–38)
BILIRUB SERPL-MCNC: 0.7 MG/DL (ref 0.2–1)
BUN SERPL-MCNC: 16 MG/DL (ref 7–18)
BUN/CREAT SERPL: 20 (ref 12–20)
CALCIUM SERPL-MCNC: 9.8 MG/DL (ref 8.5–10.1)
CHLORIDE SERPL-SCNC: 108 MMOL/L (ref 100–111)
CHOLEST SERPL-MCNC: 177 MG/DL
CO2 SERPL-SCNC: 28 MMOL/L (ref 21–32)
CREAT SERPL-MCNC: 0.79 MG/DL (ref 0.6–1.3)
GLOBULIN SER CALC-MCNC: 3.2 G/DL (ref 2–4)
GLUCOSE SERPL-MCNC: 96 MG/DL (ref 74–99)
HBA1C MFR BLD: 5.5 % (ref 4.2–5.6)
HDLC SERPL-MCNC: 63 MG/DL (ref 40–60)
HDLC SERPL: 2.8 (ref 0–5)
LDLC SERPL CALC-MCNC: 89.6 MG/DL (ref 0–100)
LIPID PANEL: ABNORMAL
POTASSIUM SERPL-SCNC: 4.4 MMOL/L (ref 3.5–5.5)
PROT SERPL-MCNC: 7.3 G/DL (ref 6.4–8.2)
SODIUM SERPL-SCNC: 138 MMOL/L (ref 136–145)
TRIGL SERPL-MCNC: 122 MG/DL
TSH SERPL DL<=0.05 MIU/L-ACNC: 2.56 UIU/ML (ref 0.36–3.74)
VLDLC SERPL CALC-MCNC: 24.4 MG/DL

## 2024-04-05 PROCEDURE — 83036 HEMOGLOBIN GLYCOSYLATED A1C: CPT

## 2024-04-05 PROCEDURE — 80061 LIPID PANEL: CPT

## 2024-04-05 PROCEDURE — 36415 COLL VENOUS BLD VENIPUNCTURE: CPT

## 2024-04-05 PROCEDURE — 84443 ASSAY THYROID STIM HORMONE: CPT

## 2024-04-05 PROCEDURE — 80053 COMPREHEN METABOLIC PANEL: CPT

## 2024-04-09 ENCOUNTER — OFFICE VISIT (OUTPATIENT)
Age: 59
End: 2024-04-09
Payer: COMMERCIAL

## 2024-04-09 VITALS
WEIGHT: 155 LBS | BODY MASS INDEX: 26.46 KG/M2 | RESPIRATION RATE: 16 BRPM | OXYGEN SATURATION: 98 % | DIASTOLIC BLOOD PRESSURE: 60 MMHG | HEART RATE: 64 BPM | SYSTOLIC BLOOD PRESSURE: 102 MMHG | HEIGHT: 64 IN | TEMPERATURE: 98.6 F

## 2024-04-09 DIAGNOSIS — Z82.49 FAMILY HISTORY OF EARLY CAD: ICD-10-CM

## 2024-04-09 DIAGNOSIS — F41.9 ANXIETY: ICD-10-CM

## 2024-04-09 DIAGNOSIS — E04.1 THYROID NODULE: ICD-10-CM

## 2024-04-09 DIAGNOSIS — I10 ESSENTIAL HYPERTENSION: Primary | ICD-10-CM

## 2024-04-09 DIAGNOSIS — Z12.31 BREAST CANCER SCREENING BY MAMMOGRAM: ICD-10-CM

## 2024-04-09 PROCEDURE — 3074F SYST BP LT 130 MM HG: CPT | Performed by: FAMILY MEDICINE

## 2024-04-09 PROCEDURE — 3078F DIAST BP <80 MM HG: CPT | Performed by: FAMILY MEDICINE

## 2024-04-09 PROCEDURE — 99214 OFFICE O/P EST MOD 30 MIN: CPT | Performed by: FAMILY MEDICINE

## 2024-04-09 RX ORDER — PRAVASTATIN SODIUM 20 MG
20 TABLET ORAL EVERY EVENING
Qty: 90 TABLET | Refills: 0 | Status: SHIPPED | OUTPATIENT
Start: 2024-04-09

## 2024-04-09 ASSESSMENT — PATIENT HEALTH QUESTIONNAIRE - PHQ9
SUM OF ALL RESPONSES TO PHQ9 QUESTIONS 1 & 2: 0
1. LITTLE INTEREST OR PLEASURE IN DOING THINGS: NOT AT ALL
SUM OF ALL RESPONSES TO PHQ QUESTIONS 1-9: 0
2. FEELING DOWN, DEPRESSED OR HOPELESS: NOT AT ALL
SUM OF ALL RESPONSES TO PHQ QUESTIONS 1-9: 0

## 2024-04-09 NOTE — PROGRESS NOTES
Rosario CHARO Orozco, 58 y.o.,  female    SUBJECTIVE  Ff-up    HTN- taking hyzaar  Anxiety- much better on zoloft    CT lung 2023 , with mother having hx pulmonary fibrosis. No sob, wheezing. Stays active  Small thyroid nodules- TSH normal, has not done US yet  Minimal atherosclerosis coronary arteries- pt reports father with CAD in his 30's    Pt reports routine gyne [facility]    ROS:  See HPI, all others negative        Patient Active Problem List   Diagnosis Code    Essential hypertension I10    Allergic rhinitis J30.9         Current Outpatient Medications:     Probiotic Product (PROBIOTIC DAILY PO), Take by mouth, Disp: , Rfl:     pravastatin (PRAVACHOL) 20 MG tablet, Take 1 tablet by mouth every evening, Disp: 90 tablet, Rfl: 0    losartan-hydroCHLOROthiazide (HYZAAR) 50-12.5 MG per tablet, TAKE 1 TABLET BY MOUTH EVERY DAY, Disp: 90 tablet, Rfl: 1    pantoprazole (PROTONIX) 40 MG tablet, Take by mouth, Disp: , Rfl:     sertraline (ZOLOFT) 50 MG tablet, TAKE 1 TABLET BY MOUTH EVERY DAY, Disp: 90 tablet, Rfl: 0      Allergies   Allergen Reactions    Onion Other (comments)       Past Medical History:   Diagnosis Date    Hypertension        Social History     Socioeconomic History    Marital status:      Spouse name: Not on file    Number of children: Not on file    Years of education: Not on file    Highest education level: Not on file   Occupational History    Not on file   Tobacco Use    Smoking status: Never    Smokeless tobacco: Never   Substance and Sexual Activity    Alcohol use: Yes     Alcohol/week: 5.0 standard drinks     Types: 5 Glasses of wine per week    Drug use: Not Currently    Sexual activity: Yes   Other Topics Concern    Not on file   Social History Narrative    Not on file     Social Determinants of Health     Financial Resource Strain: Not on file   Food Insecurity: Not on file   Transportation Needs: Not on file   Physical Activity: Not on file   Stress: Not on file   Social

## 2024-04-09 NOTE — PROGRESS NOTES
\"Have you been to the ER, urgent care clinic since your last visit?  Hospitalized since your last visit?\"    NO    “Have you seen or consulted any other health care providers outside of [facility] since your last visit?”    NO    Have you had a mammogram?”   NO    Date of last Mammogram: 8/31/2022             Click Here for Release of Records Request

## 2024-04-22 RX ORDER — LOSARTAN POTASSIUM AND HYDROCHLOROTHIAZIDE 12.5; 5 MG/1; MG/1
TABLET ORAL
Qty: 90 TABLET | Refills: 1 | Status: SHIPPED | OUTPATIENT
Start: 2024-04-22

## 2024-05-15 DIAGNOSIS — I10 ESSENTIAL HYPERTENSION: ICD-10-CM

## 2024-05-20 RX ORDER — PRAVASTATIN SODIUM 20 MG
20 TABLET ORAL EVERY EVENING
Qty: 90 TABLET | Refills: 3 | Status: SHIPPED | OUTPATIENT
Start: 2024-05-20

## 2024-07-22 ENCOUNTER — HOSPITAL ENCOUNTER (OUTPATIENT)
Facility: HOSPITAL | Age: 59
Discharge: HOME | End: 2024-07-25
Attending: FAMILY MEDICINE
Payer: COMMERCIAL

## 2024-07-22 DIAGNOSIS — E04.1 THYROID NODULE: ICD-10-CM

## 2024-07-22 PROCEDURE — 76536 US EXAM OF HEAD AND NECK: CPT

## 2024-08-09 NOTE — PROGRESS NOTES
\"Have you been to the ER, urgent care clinic since your last visit?  Hospitalized since your last visit?\"    NO    “Have you seen or consulted any other health care providers outside of [facility] since your last visit?”    NO    Have you had a mammogram?”   NO    Date of last Mammogram: 8/31/2022             Click Here for Release of Records Request

## 2024-08-10 ENCOUNTER — HOSPITAL ENCOUNTER (OUTPATIENT)
Facility: HOSPITAL | Age: 59
Discharge: HOME | End: 2024-08-13
Payer: COMMERCIAL

## 2024-08-10 DIAGNOSIS — I10 ESSENTIAL HYPERTENSION: ICD-10-CM

## 2024-08-10 LAB
ALBUMIN SERPL-MCNC: 4.3 G/DL (ref 3.4–5)
ALBUMIN/GLOB SERPL: 1.4 (ref 0.8–1.7)
ALP SERPL-CCNC: 97 U/L (ref 45–117)
ALT SERPL-CCNC: 30 U/L (ref 13–56)
ANION GAP SERPL CALC-SCNC: 6 MMOL/L (ref 3–18)
AST SERPL-CCNC: 22 U/L (ref 10–38)
BILIRUB SERPL-MCNC: 0.6 MG/DL (ref 0.2–1)
BUN SERPL-MCNC: 18 MG/DL (ref 7–18)
BUN/CREAT SERPL: 26 (ref 12–20)
CALCIUM SERPL-MCNC: 9.5 MG/DL (ref 8.5–10.1)
CHLORIDE SERPL-SCNC: 105 MMOL/L (ref 100–111)
CHOLEST SERPL-MCNC: 164 MG/DL
CO2 SERPL-SCNC: 28 MMOL/L (ref 21–32)
CREAT SERPL-MCNC: 0.7 MG/DL (ref 0.6–1.3)
GLOBULIN SER CALC-MCNC: 3 G/DL (ref 2–4)
GLUCOSE SERPL-MCNC: 94 MG/DL (ref 74–99)
HDLC SERPL-MCNC: 69 MG/DL (ref 40–60)
HDLC SERPL: 2.4 (ref 0–5)
LDLC SERPL CALC-MCNC: 68 MG/DL (ref 0–100)
LIPID PANEL: ABNORMAL
POTASSIUM SERPL-SCNC: 4.2 MMOL/L (ref 3.5–5.5)
PROT SERPL-MCNC: 7.3 G/DL (ref 6.4–8.2)
SODIUM SERPL-SCNC: 139 MMOL/L (ref 136–145)
TRIGL SERPL-MCNC: 135 MG/DL
VLDLC SERPL CALC-MCNC: 27 MG/DL

## 2024-08-10 PROCEDURE — 80053 COMPREHEN METABOLIC PANEL: CPT

## 2024-08-10 PROCEDURE — 80061 LIPID PANEL: CPT

## 2024-08-10 PROCEDURE — 36415 COLL VENOUS BLD VENIPUNCTURE: CPT

## 2024-08-13 ENCOUNTER — OFFICE VISIT (OUTPATIENT)
Facility: CLINIC | Age: 59
End: 2024-08-13
Payer: COMMERCIAL

## 2024-08-13 VITALS
HEIGHT: 64 IN | WEIGHT: 153.2 LBS | SYSTOLIC BLOOD PRESSURE: 100 MMHG | BODY MASS INDEX: 26.15 KG/M2 | HEART RATE: 65 BPM | DIASTOLIC BLOOD PRESSURE: 70 MMHG | OXYGEN SATURATION: 98 % | TEMPERATURE: 98.6 F | RESPIRATION RATE: 16 BRPM

## 2024-08-13 DIAGNOSIS — F41.9 ANXIETY: ICD-10-CM

## 2024-08-13 DIAGNOSIS — I10 ESSENTIAL HYPERTENSION: Primary | ICD-10-CM

## 2024-08-13 DIAGNOSIS — Z82.49 FAMILY HISTORY OF EARLY CAD: ICD-10-CM

## 2024-08-13 DIAGNOSIS — E04.1 THYROID NODULE: ICD-10-CM

## 2024-08-13 PROCEDURE — 3074F SYST BP LT 130 MM HG: CPT | Performed by: FAMILY MEDICINE

## 2024-08-13 PROCEDURE — 3078F DIAST BP <80 MM HG: CPT | Performed by: FAMILY MEDICINE

## 2024-08-13 PROCEDURE — 99214 OFFICE O/P EST MOD 30 MIN: CPT | Performed by: FAMILY MEDICINE

## 2024-08-13 SDOH — ECONOMIC STABILITY: FOOD INSECURITY: WITHIN THE PAST 12 MONTHS, THE FOOD YOU BOUGHT JUST DIDN'T LAST AND YOU DIDN'T HAVE MONEY TO GET MORE.: NEVER TRUE

## 2024-08-13 SDOH — ECONOMIC STABILITY: INCOME INSECURITY: HOW HARD IS IT FOR YOU TO PAY FOR THE VERY BASICS LIKE FOOD, HOUSING, MEDICAL CARE, AND HEATING?: NOT HARD AT ALL

## 2024-08-13 SDOH — ECONOMIC STABILITY: FOOD INSECURITY: WITHIN THE PAST 12 MONTHS, YOU WORRIED THAT YOUR FOOD WOULD RUN OUT BEFORE YOU GOT MONEY TO BUY MORE.: NEVER TRUE

## 2024-08-13 ASSESSMENT — ANXIETY QUESTIONNAIRES
5. BEING SO RESTLESS THAT IT IS HARD TO SIT STILL: NOT AT ALL
IF YOU CHECKED OFF ANY PROBLEMS ON THIS QUESTIONNAIRE, HOW DIFFICULT HAVE THESE PROBLEMS MADE IT FOR YOU TO DO YOUR WORK, TAKE CARE OF THINGS AT HOME, OR GET ALONG WITH OTHER PEOPLE: NOT DIFFICULT AT ALL
GAD7 TOTAL SCORE: 0
6. BECOMING EASILY ANNOYED OR IRRITABLE: NOT AT ALL
1. FEELING NERVOUS, ANXIOUS, OR ON EDGE: NOT AT ALL
3. WORRYING TOO MUCH ABOUT DIFFERENT THINGS: NOT AT ALL
7. FEELING AFRAID AS IF SOMETHING AWFUL MIGHT HAPPEN: NOT AT ALL
4. TROUBLE RELAXING: NOT AT ALL
2. NOT BEING ABLE TO STOP OR CONTROL WORRYING: NOT AT ALL

## 2024-08-13 NOTE — PROGRESS NOTES
Rosario Orozco, 59 y.o.,  female    SUBJECTIVE  Ff-up    HTN- taking hyzaar  Anxiety-reports to be doing well, continues to take Zoloft    CT lung 2023 , with mother having hx pulmonary fibrosis. No sob, wheezing. Stays active  Small thyroid nodules- TSH normal, reviewed ultrasound small right lobe nodule less than 1.5 TI-RADS 3  Minimal atherosclerosis coronary arteries- pt reports father with CAD in his 30's    Pt reports routine gyne per patient upcoming appointment she is reminded to schedule mammogram this year provided number    ROS:  See HPI, all others negative        Patient Active Problem List   Diagnosis Code    Essential hypertension I10    Allergic rhinitis J30.9         Current Outpatient Medications:     Multiple Vitamins-Minerals (PRESERVISION AREDS PO), Take by mouth, Disp: , Rfl:     pravastatin (PRAVACHOL) 20 MG tablet, TAKE 1 TABLET BY MOUTH EVERY DAY IN THE EVENING, Disp: 90 tablet, Rfl: 3    sertraline (ZOLOFT) 50 MG tablet, TAKE 1 TABLET BY MOUTH EVERY DAY, Disp: 90 tablet, Rfl: 0    losartan-hydroCHLOROthiazide (HYZAAR) 50-12.5 MG per tablet, TAKE 1 TABLET BY MOUTH EVERY DAY, Disp: 90 tablet, Rfl: 1    Probiotic Product (PROBIOTIC DAILY PO), Take by mouth, Disp: , Rfl:     pantoprazole (PROTONIX) 40 MG tablet, Take by mouth, Disp: , Rfl:       Allergies   Allergen Reactions    Onion Other (comments)       Past Medical History:   Diagnosis Date    Hypertension        Social History     Socioeconomic History    Marital status:      Spouse name: Not on file    Number of children: Not on file    Years of education: Not on file    Highest education level: Not on file   Occupational History    Not on file   Tobacco Use    Smoking status: Never    Smokeless tobacco: Never   Substance and Sexual Activity    Alcohol use: Yes     Alcohol/week: 5.0 standard drinks     Types: 5 Glasses of wine per week    Drug use: Not Currently    Sexual activity: Yes   Other Topics Concern    Not on file   Social

## 2024-08-15 RX ORDER — LOSARTAN POTASSIUM AND HYDROCHLOROTHIAZIDE 12.5; 5 MG/1; MG/1
TABLET ORAL
Qty: 90 TABLET | Refills: 1 | Status: SHIPPED | OUTPATIENT
Start: 2024-08-15

## 2025-04-09 RX ORDER — LOSARTAN POTASSIUM AND HYDROCHLOROTHIAZIDE 12.5; 5 MG/1; MG/1
1 TABLET ORAL DAILY
Qty: 90 TABLET | Refills: 0 | Status: SHIPPED | OUTPATIENT
Start: 2025-04-09

## 2025-07-01 ENCOUNTER — HOSPITAL ENCOUNTER (OUTPATIENT)
Age: 60
Discharge: HOME | End: 2025-07-01
Payer: COMMERCIAL

## 2025-07-01 DIAGNOSIS — I10 ESSENTIAL HYPERTENSION: ICD-10-CM

## 2025-07-01 LAB
ALBUMIN SERPL-MCNC: 4.2 G/DL (ref 3.4–5)
ALBUMIN/GLOB SERPL: 1.5 (ref 0.8–1.7)
ALP SERPL-CCNC: 107 U/L (ref 45–117)
ALT SERPL-CCNC: 26 U/L (ref 10–35)
ANION GAP SERPL CALC-SCNC: 13 MMOL/L (ref 3–18)
AST SERPL-CCNC: 30 U/L (ref 10–38)
BILIRUB SERPL-MCNC: 0.4 MG/DL (ref 0.2–1)
BUN SERPL-MCNC: 14 MG/DL (ref 6–23)
BUN/CREAT SERPL: 18 (ref 12–20)
CALCIUM SERPL-MCNC: 10.4 MG/DL (ref 8.5–10.1)
CHLORIDE SERPL-SCNC: 102 MMOL/L (ref 98–107)
CO2 SERPL-SCNC: 26 MMOL/L (ref 21–32)
CREAT SERPL-MCNC: 0.78 MG/DL (ref 0.6–1.3)
GLOBULIN SER CALC-MCNC: 2.8 G/DL (ref 2–4)
GLUCOSE SERPL-MCNC: 98 MG/DL (ref 74–108)
POTASSIUM SERPL-SCNC: 4.2 MMOL/L (ref 3.5–5.5)
PROT SERPL-MCNC: 7 G/DL (ref 6.4–8.2)
SODIUM SERPL-SCNC: 141 MMOL/L (ref 136–145)

## 2025-07-01 PROCEDURE — 80053 COMPREHEN METABOLIC PANEL: CPT

## 2025-07-01 PROCEDURE — 36415 COLL VENOUS BLD VENIPUNCTURE: CPT

## 2025-07-03 ENCOUNTER — OFFICE VISIT (OUTPATIENT)
Facility: CLINIC | Age: 60
End: 2025-07-03
Payer: COMMERCIAL

## 2025-07-03 VITALS
DIASTOLIC BLOOD PRESSURE: 70 MMHG | RESPIRATION RATE: 16 BRPM | TEMPERATURE: 98.4 F | HEART RATE: 66 BPM | WEIGHT: 160.4 LBS | HEIGHT: 64 IN | SYSTOLIC BLOOD PRESSURE: 110 MMHG | OXYGEN SATURATION: 99 % | BODY MASS INDEX: 27.39 KG/M2

## 2025-07-03 DIAGNOSIS — E83.52 SERUM CALCIUM ELEVATED: ICD-10-CM

## 2025-07-03 DIAGNOSIS — R53.82 CHRONIC FATIGUE: ICD-10-CM

## 2025-07-03 DIAGNOSIS — I10 ESSENTIAL HYPERTENSION: Primary | ICD-10-CM

## 2025-07-03 DIAGNOSIS — F41.9 ANXIETY: ICD-10-CM

## 2025-07-03 DIAGNOSIS — Z82.49 FAMILY HISTORY OF EARLY CAD: ICD-10-CM

## 2025-07-03 PROBLEM — S82.65XA: Status: RESOLVED | Noted: 2017-06-26 | Resolved: 2025-07-03

## 2025-07-03 PROBLEM — R87.610 ATYPICAL SQUAMOUS CELLS OF UNDETERMINED SIGNIFICANCE ON CYTOLOGIC SMEAR OF CERVIX (ASC-US): Status: RESOLVED | Noted: 2019-03-28 | Resolved: 2025-07-03

## 2025-07-03 PROBLEM — R06.00 DYSPNEA, UNSPECIFIED: Status: RESOLVED | Noted: 2019-06-10 | Resolved: 2025-07-03

## 2025-07-03 PROBLEM — R07.2 PRECORDIAL PAIN: Status: RESOLVED | Noted: 2020-10-07 | Resolved: 2025-07-03

## 2025-07-03 PROBLEM — N95.0 POSTMENOPAUSAL BLEEDING: Status: RESOLVED | Noted: 2019-02-25 | Resolved: 2025-07-03

## 2025-07-03 PROBLEM — K92.2 GASTROINTESTINAL HEMORRHAGE, UNSPECIFIED: Status: RESOLVED | Noted: 2017-03-03 | Resolved: 2025-07-03

## 2025-07-03 PROCEDURE — 99214 OFFICE O/P EST MOD 30 MIN: CPT | Performed by: FAMILY MEDICINE

## 2025-07-03 PROCEDURE — 3078F DIAST BP <80 MM HG: CPT | Performed by: FAMILY MEDICINE

## 2025-07-03 PROCEDURE — 3074F SYST BP LT 130 MM HG: CPT | Performed by: FAMILY MEDICINE

## 2025-07-03 RX ORDER — PRAVASTATIN SODIUM 20 MG
20 TABLET ORAL EVERY EVENING
Qty: 90 TABLET | Refills: 0 | Status: SHIPPED | OUTPATIENT
Start: 2025-07-03

## 2025-07-03 RX ORDER — LOSARTAN POTASSIUM 50 MG/1
50 TABLET ORAL DAILY
Qty: 90 TABLET | Refills: 0 | Status: SHIPPED | OUTPATIENT
Start: 2025-07-03

## 2025-07-03 RX ORDER — LOSARTAN POTASSIUM AND HYDROCHLOROTHIAZIDE 12.5; 5 MG/1; MG/1
1 TABLET ORAL DAILY
Qty: 90 TABLET | Refills: 1 | Status: CANCELLED | OUTPATIENT
Start: 2025-07-03

## 2025-07-03 RX ORDER — PANTOPRAZOLE SODIUM 40 MG/1
40 TABLET, DELAYED RELEASE ORAL DAILY
Qty: 90 TABLET | Refills: 3 | Status: SHIPPED | OUTPATIENT
Start: 2025-07-03

## 2025-07-03 SDOH — ECONOMIC STABILITY: FOOD INSECURITY: WITHIN THE PAST 12 MONTHS, YOU WORRIED THAT YOUR FOOD WOULD RUN OUT BEFORE YOU GOT MONEY TO BUY MORE.: NEVER TRUE

## 2025-07-03 SDOH — ECONOMIC STABILITY: FOOD INSECURITY: WITHIN THE PAST 12 MONTHS, THE FOOD YOU BOUGHT JUST DIDN'T LAST AND YOU DIDN'T HAVE MONEY TO GET MORE.: NEVER TRUE

## 2025-07-03 ASSESSMENT — PATIENT HEALTH QUESTIONNAIRE - PHQ9
1. LITTLE INTEREST OR PLEASURE IN DOING THINGS: NOT AT ALL
SUM OF ALL RESPONSES TO PHQ QUESTIONS 1-9: 0
2. FEELING DOWN, DEPRESSED OR HOPELESS: NOT AT ALL
SUM OF ALL RESPONSES TO PHQ QUESTIONS 1-9: 0

## 2025-07-03 NOTE — PROGRESS NOTES
Rosario Orozco, 59 y.o.,  female    SUBJECTIVE  Ff-up    HTN- taking hyzaar. Reviewed labs mild hypercalcemia  Anxiety-reports to be doing well, continues to take Zoloft    HL- reports recent dx of fatty liver disease, following Dr. Marie fibroscan mild MASH.  Minimal atherosclerosis coronary arteries incidentally found on CT 2023,  pt reports father with CAD in his 30's, taking pravachol.     Pt reports routine gyne per patient UTD Dr. Pitts    Feeling overly sensitive to heat, low energy, some weight gain.     ROS:  See HPI, all others negative        Patient Active Problem List   Diagnosis Code    Essential hypertension I10    Allergic rhinitis J30.9         Current Outpatient Medications:     sertraline (ZOLOFT) 50 MG tablet, Take 1 tablet by mouth daily, Disp: 90 tablet, Rfl: 1    pravastatin (PRAVACHOL) 20 MG tablet, Take 1 tablet by mouth every evening, Disp: 90 tablet, Rfl: 0    pantoprazole (PROTONIX) 40 MG tablet, Take 1 tablet by mouth daily, Disp: 90 tablet, Rfl: 3    losartan (COZAAR) 50 MG tablet, Take 1 tablet by mouth daily, Disp: 90 tablet, Rfl: 0    Multiple Vitamins-Minerals (PRESERVISION AREDS PO), Take by mouth, Disp: , Rfl:     Probiotic Product (PROBIOTIC DAILY PO), Take by mouth, Disp: , Rfl:     OBJECTIVE    Physical Exam:     /70   Pulse 66   Temp 98.4 °F (36.9 °C) (Temporal)   Resp 16   Ht 1.626 m (5' 4\")   Wt 72.8 kg (160 lb 6.4 oz)   SpO2 99%   BMI 27.53 kg/m²       General: alert, well-appearing,  in no apparent distress or pain  Neck:no palpable nodules  CVS: normal rate, regular rhythm, distinct S1 and S2  Lungs:clear to ausculation bilaterally, no crackles, wheezing or rhonchi noted  Extremities: no edema, no cyanosis, MSK grossly normal  Skin: warm, no lesions, rashes noted  Psych:  mood and affect normal  CMP:   Lab Results   Component Value Date/Time     07/01/2025 08:04 AM    K 4.2 07/01/2025 08:04 AM     07/01/2025 08:04 AM    CO2 26 07/01/2025 08:04 AM

## 2025-07-03 NOTE — PROGRESS NOTES
Have you been to the ER, urgent care clinic since your last visit?  Hospitalized since your last visit?   NO    Have you seen or consulted any other health care providers outside our system since your last visit?   YES - When: approximately 2 months ago.  Where and Why: gyn .     “Have you had a pap smear?”    YES - Where: [facility]  Nurse/CMA to request most recent records if not in the chart    Date of last Cervical Cancer screen (HPV or PAP): 10/7/2021

## 2025-07-09 DIAGNOSIS — I10 ESSENTIAL HYPERTENSION: ICD-10-CM

## 2025-07-09 RX ORDER — PRAVASTATIN SODIUM 20 MG
20 TABLET ORAL EVERY EVENING
Qty: 90 TABLET | Refills: 3 | Status: SHIPPED | OUTPATIENT
Start: 2025-07-09

## 2025-08-19 ENCOUNTER — HOSPITAL ENCOUNTER (OUTPATIENT)
Age: 60
Discharge: HOME | End: 2025-08-19
Payer: COMMERCIAL

## 2025-08-19 DIAGNOSIS — I10 ESSENTIAL HYPERTENSION: ICD-10-CM

## 2025-08-19 DIAGNOSIS — R53.82 CHRONIC FATIGUE: ICD-10-CM

## 2025-08-19 LAB
25(OH)D3 SERPL-MCNC: 33.2 NG/ML (ref 30–100)
ALBUMIN SERPL-MCNC: 3.9 G/DL (ref 3.4–5)
ALBUMIN/GLOB SERPL: 1.5 (ref 0.8–1.7)
ALP SERPL-CCNC: 102 U/L (ref 45–117)
ALT SERPL-CCNC: 18 U/L (ref 10–35)
ANION GAP SERPL CALC-SCNC: 12 MMOL/L (ref 3–18)
AST SERPL-CCNC: 23 U/L (ref 10–38)
BASOPHILS # BLD: 0.06 K/UL (ref 0–0.1)
BASOPHILS NFR BLD: 0.7 % (ref 0–2)
BILIRUB SERPL-MCNC: 0.3 MG/DL (ref 0.2–1)
BUN SERPL-MCNC: 15 MG/DL (ref 6–23)
BUN/CREAT SERPL: 21 (ref 12–20)
CALCIUM SERPL-MCNC: 9.7 MG/DL (ref 8.5–10.1)
CHLORIDE SERPL-SCNC: 105 MMOL/L (ref 98–107)
CHOLEST SERPL-MCNC: 170 MG/DL
CO2 SERPL-SCNC: 25 MMOL/L (ref 21–32)
CREAT SERPL-MCNC: 0.72 MG/DL (ref 0.6–1.3)
DIFFERENTIAL METHOD BLD: NORMAL
EOSINOPHIL # BLD: 0.21 K/UL (ref 0–0.4)
EOSINOPHIL NFR BLD: 2.6 % (ref 0–5)
ERYTHROCYTE [DISTWIDTH] IN BLOOD BY AUTOMATED COUNT: 12.4 % (ref 11.6–14.5)
FOLATE SERPL-MCNC: 7.6 NG/ML (ref 4.6–34.8)
GLOBULIN SER CALC-MCNC: 2.7 G/DL (ref 2–4)
GLUCOSE SERPL-MCNC: 100 MG/DL (ref 74–108)
HCT VFR BLD AUTO: 43.3 % (ref 35–45)
HDLC SERPL-MCNC: 66 MG/DL (ref 40–60)
HDLC SERPL: 2.6 (ref 0–5)
HGB BLD-MCNC: 13.7 G/DL (ref 12–16)
IMM GRANULOCYTES # BLD AUTO: 0.02 K/UL (ref 0–0.04)
IMM GRANULOCYTES NFR BLD AUTO: 0.2 % (ref 0–0.5)
LDLC SERPL CALC-MCNC: 82 MG/DL (ref 0–100)
LYMPHOCYTES # BLD: 2.68 K/UL (ref 0.9–3.6)
LYMPHOCYTES NFR BLD: 33.4 % (ref 21–52)
MCH RBC QN AUTO: 30 PG (ref 24–34)
MCHC RBC AUTO-ENTMCNC: 31.6 G/DL (ref 31–37)
MCV RBC AUTO: 94.7 FL (ref 78–100)
MONOCYTES # BLD: 0.51 K/UL (ref 0.05–1.2)
MONOCYTES NFR BLD: 6.4 % (ref 3–10)
NEUTS SEG # BLD: 4.55 K/UL (ref 1.8–8)
NEUTS SEG NFR BLD: 56.7 % (ref 40–73)
NRBC # BLD: 0 K/UL (ref 0–0.01)
NRBC BLD-RTO: 0 PER 100 WBC
PLATELET # BLD AUTO: 272 K/UL (ref 135–420)
PMV BLD AUTO: 11.5 FL (ref 9.2–11.8)
POTASSIUM SERPL-SCNC: 4.9 MMOL/L (ref 3.5–5.5)
PROT SERPL-MCNC: 6.6 G/DL (ref 6.4–8.2)
RBC # BLD AUTO: 4.57 M/UL (ref 4.2–5.3)
SODIUM SERPL-SCNC: 141 MMOL/L (ref 136–145)
TRIGL SERPL-MCNC: 111 MG/DL (ref 0–150)
TSH, 3RD GENERATION: 3.28 UIU/ML (ref 0.27–4.2)
VIT B12 SERPL-MCNC: 687 PG/ML (ref 211–911)
VLDLC SERPL CALC-MCNC: 22 MG/DL
WBC # BLD AUTO: 8 K/UL (ref 4.6–13.2)

## 2025-08-19 PROCEDURE — 82306 VITAMIN D 25 HYDROXY: CPT

## 2025-08-19 PROCEDURE — 80053 COMPREHEN METABOLIC PANEL: CPT

## 2025-08-19 PROCEDURE — 80061 LIPID PANEL: CPT

## 2025-08-19 PROCEDURE — 82607 VITAMIN B-12: CPT

## 2025-08-19 PROCEDURE — 82746 ASSAY OF FOLIC ACID SERUM: CPT

## 2025-08-19 PROCEDURE — 84443 ASSAY THYROID STIM HORMONE: CPT

## 2025-08-19 PROCEDURE — 36415 COLL VENOUS BLD VENIPUNCTURE: CPT

## 2025-08-19 PROCEDURE — 85025 COMPLETE CBC W/AUTO DIFF WBC: CPT

## 2025-08-20 ENCOUNTER — OFFICE VISIT (OUTPATIENT)
Facility: CLINIC | Age: 60
End: 2025-08-20
Payer: COMMERCIAL

## 2025-08-20 VITALS
HEART RATE: 75 BPM | OXYGEN SATURATION: 99 % | DIASTOLIC BLOOD PRESSURE: 70 MMHG | HEIGHT: 64 IN | SYSTOLIC BLOOD PRESSURE: 120 MMHG | TEMPERATURE: 97.5 F | BODY MASS INDEX: 27.66 KG/M2 | WEIGHT: 162 LBS | RESPIRATION RATE: 16 BRPM

## 2025-08-20 DIAGNOSIS — I10 ESSENTIAL HYPERTENSION: Primary | ICD-10-CM

## 2025-08-20 DIAGNOSIS — Z82.49 FAMILY HISTORY OF EARLY CAD: ICD-10-CM

## 2025-08-20 DIAGNOSIS — J30.9 ALLERGIC RHINITIS, UNSPECIFIED SEASONALITY, UNSPECIFIED TRIGGER: ICD-10-CM

## 2025-08-20 DIAGNOSIS — R53.82 CHRONIC FATIGUE: ICD-10-CM

## 2025-08-20 DIAGNOSIS — F41.9 ANXIETY: ICD-10-CM

## 2025-08-20 PROCEDURE — 99214 OFFICE O/P EST MOD 30 MIN: CPT | Performed by: FAMILY MEDICINE

## 2025-08-20 PROCEDURE — 3074F SYST BP LT 130 MM HG: CPT | Performed by: FAMILY MEDICINE

## 2025-08-20 PROCEDURE — 3078F DIAST BP <80 MM HG: CPT | Performed by: FAMILY MEDICINE
